# Patient Record
Sex: FEMALE | Race: WHITE | NOT HISPANIC OR LATINO | Employment: OTHER | URBAN - METROPOLITAN AREA
[De-identification: names, ages, dates, MRNs, and addresses within clinical notes are randomized per-mention and may not be internally consistent; named-entity substitution may affect disease eponyms.]

---

## 2018-03-22 LAB — HCV AB SER-ACNC: NEGATIVE

## 2019-05-20 LAB
ALBUMIN/GLOBULIN RATIO: 1.3
ALBUMIN: 3.9
ALP SERPL-CCNC: 156 U/L
ALT: 21
AST: 22
BILIRUBIN TOTAL: 0.4
CALCIUM SERPL-MCNC: 9.4 MG/DL
CARBON DIOXIDE, CO2: 28
CHLORIDE: 102
CHOLESTEROL, TOTAL: 202
CREAT SERPL-MCNC: 0.8 MG/DL
GLOBULIN: 3.1
GLUCOSE: 87
HDLC SERPL-MCNC: 53 MG/DL
LDLC SERPL CALC-MCNC: 131 MG/DL (ref 0–160)
POTASSIUM: 4.4
SODIUM: 139
TOTAL PROTEIN: 7 G/DL (ref 6.4–8.2)
TRIGL SERPL-MCNC: 84 MG/DL
UREA NITROGEN (BUN): 15

## 2019-08-16 ENCOUNTER — TELEPHONE (OUTPATIENT)
Dept: FAMILY MEDICINE | Facility: CLINIC | Age: 69
End: 2019-08-16

## 2019-08-16 ENCOUNTER — OFFICE VISIT (OUTPATIENT)
Dept: FAMILY MEDICINE | Facility: CLINIC | Age: 69
End: 2019-08-16
Payer: MEDICARE

## 2019-08-16 VITALS — BODY MASS INDEX: 40.48 KG/M2 | HEIGHT: 62 IN | WEIGHT: 220 LBS | TEMPERATURE: 99 F

## 2019-08-16 DIAGNOSIS — Z79.899 ENCOUNTER FOR LONG-TERM (CURRENT) USE OF MEDICATIONS: ICD-10-CM

## 2019-08-16 DIAGNOSIS — Z76.0 MEDICATION REFILL: Primary | ICD-10-CM

## 2019-08-16 DIAGNOSIS — M79.7 FIBROMYALGIA: ICD-10-CM

## 2019-08-16 DIAGNOSIS — E66.01 MORBID OBESITY WITH BMI OF 40.0-44.9, ADULT: ICD-10-CM

## 2019-08-16 PROBLEM — N17.9 AKI (ACUTE KIDNEY INJURY): Status: ACTIVE | Noted: 2018-07-04

## 2019-08-16 PROBLEM — E87.1 HYPONATREMIA: Status: ACTIVE | Noted: 2019-08-16

## 2019-08-16 PROBLEM — J96.01 ACUTE RESPIRATORY FAILURE WITH HYPOXIA AND HYPERCAPNIA: Status: ACTIVE | Noted: 2018-07-04

## 2019-08-16 PROBLEM — E87.20 ACIDOSIS: Status: ACTIVE | Noted: 2018-07-05

## 2019-08-16 PROBLEM — M17.0 PRIMARY OSTEOARTHRITIS OF BOTH KNEES: Status: ACTIVE | Noted: 2018-05-25

## 2019-08-16 PROBLEM — Z91.199 NON COMPLIANCE WITH MEDICAL TREATMENT: Status: ACTIVE | Noted: 2018-07-04

## 2019-08-16 PROBLEM — E87.6 HYPOKALEMIA: Status: ACTIVE | Noted: 2019-08-16

## 2019-08-16 PROBLEM — M85.89 OSTEOPENIA OF MULTIPLE SITES: Status: ACTIVE | Noted: 2018-08-16

## 2019-08-16 PROBLEM — J44.9 COPD (CHRONIC OBSTRUCTIVE PULMONARY DISEASE): Status: ACTIVE | Noted: 2019-08-16

## 2019-08-16 PROBLEM — J96.02 ACUTE RESPIRATORY FAILURE WITH HYPOXIA AND HYPERCAPNIA: Status: ACTIVE | Noted: 2018-07-04

## 2019-08-16 PROBLEM — M17.12 PRIMARY OSTEOARTHRITIS OF LEFT KNEE: Status: ACTIVE | Noted: 2018-07-02

## 2019-08-16 PROBLEM — H92.02 OTALGIA OF LEFT EAR: Status: ACTIVE | Noted: 2017-01-25

## 2019-08-16 PROCEDURE — 99999 PR PBB SHADOW E&M-NEW PATIENT-LVL III: CPT | Mod: PBBFAC,,, | Performed by: FAMILY MEDICINE

## 2019-08-16 PROCEDURE — 99999 PR PBB SHADOW E&M-NEW PATIENT-LVL III: ICD-10-PCS | Mod: PBBFAC,,, | Performed by: FAMILY MEDICINE

## 2019-08-16 PROCEDURE — 1101F PR PT FALLS ASSESS DOC 0-1 FALLS W/OUT INJ PAST YR: ICD-10-PCS | Mod: CPTII,S$GLB,, | Performed by: FAMILY MEDICINE

## 2019-08-16 PROCEDURE — 99204 PR OFFICE/OUTPT VISIT, NEW, LEVL IV, 45-59 MIN: ICD-10-PCS | Mod: S$GLB,,, | Performed by: FAMILY MEDICINE

## 2019-08-16 PROCEDURE — 1101F PT FALLS ASSESS-DOCD LE1/YR: CPT | Mod: CPTII,S$GLB,, | Performed by: FAMILY MEDICINE

## 2019-08-16 PROCEDURE — 99204 OFFICE O/P NEW MOD 45 MIN: CPT | Mod: S$GLB,,, | Performed by: FAMILY MEDICINE

## 2019-08-16 RX ORDER — ALPRAZOLAM 0.5 MG/1
0.5 TABLET ORAL NIGHTLY PRN
COMMUNITY
End: 2019-08-16 | Stop reason: SDUPTHER

## 2019-08-16 RX ORDER — ALBUTEROL SULFATE 90 UG/1
2 AEROSOL, METERED RESPIRATORY (INHALATION) EVERY 6 HOURS PRN
Qty: 1 INHALER | Refills: 3 | Status: SHIPPED | OUTPATIENT
Start: 2019-08-16

## 2019-08-16 RX ORDER — PRAVASTATIN SODIUM 20 MG/1
20 TABLET ORAL DAILY
Qty: 90 TABLET | Refills: 0 | Status: SHIPPED | OUTPATIENT
Start: 2019-08-16 | End: 2019-11-11 | Stop reason: SDUPTHER

## 2019-08-16 RX ORDER — VENLAFAXINE HYDROCHLORIDE 75 MG/1
75 CAPSULE, EXTENDED RELEASE ORAL DAILY
Qty: 90 CAPSULE | Refills: 0 | Status: SHIPPED | OUTPATIENT
Start: 2019-08-16 | End: 2019-11-11 | Stop reason: SDUPTHER

## 2019-08-16 RX ORDER — PANTOPRAZOLE SODIUM 40 MG/1
40 TABLET, DELAYED RELEASE ORAL DAILY
COMMUNITY
End: 2019-08-16 | Stop reason: SDUPTHER

## 2019-08-16 RX ORDER — CARVEDILOL 12.5 MG/1
12.5 TABLET ORAL 2 TIMES DAILY WITH MEALS
Qty: 180 TABLET | Refills: 0 | Status: SHIPPED | OUTPATIENT
Start: 2019-08-16 | End: 2019-11-11 | Stop reason: SDUPTHER

## 2019-08-16 RX ORDER — PANTOPRAZOLE SODIUM 40 MG/1
40 TABLET, DELAYED RELEASE ORAL DAILY
Qty: 90 TABLET | Refills: 0 | Status: SHIPPED | OUTPATIENT
Start: 2019-08-16 | End: 2019-11-11 | Stop reason: SDUPTHER

## 2019-08-16 RX ORDER — CARVEDILOL 12.5 MG/1
12.5 TABLET ORAL 2 TIMES DAILY WITH MEALS
COMMUNITY
End: 2019-08-16 | Stop reason: SDUPTHER

## 2019-08-16 RX ORDER — VENLAFAXINE HYDROCHLORIDE 75 MG/1
75 CAPSULE, EXTENDED RELEASE ORAL DAILY
COMMUNITY
End: 2019-08-16 | Stop reason: SDUPTHER

## 2019-08-16 RX ORDER — ALPRAZOLAM 0.5 MG/1
0.5 TABLET ORAL NIGHTLY PRN
Qty: 30 TABLET | Refills: 0 | Status: SHIPPED | OUTPATIENT
Start: 2019-08-16 | End: 2019-09-30 | Stop reason: SDUPTHER

## 2019-08-16 RX ORDER — CYCLOBENZAPRINE HCL 10 MG
10 TABLET ORAL 2 TIMES DAILY
Qty: 60 TABLET | Refills: 3 | Status: SHIPPED | OUTPATIENT
Start: 2019-08-16 | End: 2020-01-28

## 2019-08-16 RX ORDER — RIZATRIPTAN BENZOATE 10 MG/1
10 TABLET ORAL
COMMUNITY
End: 2022-10-20

## 2019-08-16 NOTE — TELEPHONE ENCOUNTER
----- Message from Nereyda Puentes sent at 8/16/2019  3:55 PM CDT -----  Contact: patient   Patient states her migraine medication has yet to be called into pharmacy.Please call back at [phone].      Thanks,  Nereyda uPentes

## 2019-08-16 NOTE — TELEPHONE ENCOUNTER
Patient states she is needing a refill of her Esgic sent to [facility]. Do not see this med on profile. Pt states she takes this for her migraines and is having bad headache now.

## 2019-08-18 PROBLEM — J96.01 ACUTE RESPIRATORY FAILURE WITH HYPOXIA AND HYPERCAPNIA: Status: RESOLVED | Noted: 2018-07-04 | Resolved: 2019-08-18

## 2019-08-18 PROBLEM — J96.02 ACUTE RESPIRATORY FAILURE WITH HYPOXIA AND HYPERCAPNIA: Status: RESOLVED | Noted: 2018-07-04 | Resolved: 2019-08-18

## 2019-08-18 RX ORDER — BUTALBITAL, ACETAMINOPHEN AND CAFFEINE 50; 325; 40 MG/1; MG/1; MG/1
1 TABLET ORAL EVERY 6 HOURS PRN
Qty: 30 TABLET | Refills: 1 | Status: SHIPPED | OUTPATIENT
Start: 2019-08-18 | End: 2019-09-17

## 2019-08-19 NOTE — ASSESSMENT & PLAN NOTE
Alkaline phosphatase is elevated.  Will monitor.    Complete history and physical was completed today.  Complete and thorough medication reconciliation was performed.  Discussed risks and benefits of medications.  Advised patient on orders and health maintenance.  We discussed old records and old labs if available.  Will request any records not available through epic.  Continue current medications listed on your summary sheet.

## 2019-08-19 NOTE — PROGRESS NOTES
Subjective:      Patient ID: Maria M Reeves is a 68 y.o. female.    Chief Complaint: Establish Care and Medication Refill      Problem List Items Addressed This Visit     Morbid obesity with BMI of 40.0-44.9, adult    Overview     BMI Readings from Last 3 Encounters:   08/16/19 40.24 kg/m²   01/15/13 42.43 kg/m²   01/15/13 42.44 kg/m²              Medication refill - Primary    Overview     See long-term med use above.  Patient needs refills reports compliance.  No side effects reported.         Encounter for long-term (current) use of medications    Overview     August 16, 2019   Patient is on CHRONIC long-term drug therapy for managed conditions. See medication list. Reports compliance.  No side effects reported.  Routine lab work is being monitored.  Patient does  need refills today.     Lab Results   Component Value Date    WBC 8.00 01/15/2013    HGB 13.6 01/15/2013    HCT 41.2 01/15/2013    MCV 86.2 01/15/2013     01/15/2013      CMP  Sodium   Date Value Ref Range Status   01/15/2013 142 136 - 145 mmol/L Final     Potassium   Date Value Ref Range Status   01/15/2013 3.8 3.5 - 5.1 mmol/L Final     Chloride   Date Value Ref Range Status   01/15/2013 104 95 - 110 mmol/L Final     CO2   Date Value Ref Range Status   01/15/2013 26 23 - 29 mmol/L Final     Glucose   Date Value Ref Range Status   01/15/2013 105 70 - 110 mg/dl Final     BUN, Bld   Date Value Ref Range Status   01/15/2013 11 8 - 23 mg/dl Final     Creatinine   Date Value Ref Range Status   01/15/2013 0.8 0.5 - 1.4 mg/dl Final     Calcium   Date Value Ref Range Status   01/15/2013 9.7 8.7 - 10.5 mg/dl Final     Total Protein   Date Value Ref Range Status   01/15/2013 7.9 6.0 - 8.4 g/dL Final     Albumin   Date Value Ref Range Status   01/15/2013 4.0 3.5 - 5.2 g/dl Final     Total Bilirubin   Date Value Ref Range Status   01/15/2013 0.5 0.1 - 1.0 mg/dl Final     Comment:     For infants and newborns, interpretation of results should be based  on  gestational age, weight and in agreement with clinical  observations.  .  Premature Infant recommended reference ranges:  Up to 24 hours.............<8.0 mg/dl  Up to 48 hours............<12.0 mg/dl  3-5 days..................<15.0 mg/dl  6-29 days.................<15.0 mg/dl     Alkaline Phosphatase   Date Value Ref Range Status   01/15/2013 148 (H) 55 - 135 U/L Final     AST   Date Value Ref Range Status   01/15/2013 28 10 - 40 U/L Final     ALT   Date Value Ref Range Status   01/15/2013 21 10 - 44 U/L Final     Anion Gap   Date Value Ref Range Status   01/15/2013 12.0 8 - 16 mmol/L Final     eGFR if    Date Value Ref Range Status   01/15/2013 >60 >60 mL/min Final     Comment:     Estimated glomerular filtration rate (eGFR) is normalized to an  average body surface area of 1.73 square meters.  The calculation  used to obtain the eGFR is the adjusted MDRD equation, which factors  patient sex, age, race, and creatinine result.  Since race is unknown  in our information system, the eGFR values for -American  and Non--American patients are given for each creatinine  result.     eGFR if non    Date Value Ref Range Status   01/15/2013 >60 >60 mL/min Final     No results found for: TSH            Current Assessment & Plan     Alkaline phosphatase is elevated.  Will monitor.    Complete history and physical was completed today.  Complete and thorough medication reconciliation was performed.  Discussed risks and benefits of medications.  Advised patient on orders and health maintenance.  We discussed old records and old labs if available.  Will request any records not available through epic.  Continue current medications listed on your summary sheet.           Fibromyalgia    Overview     Chronic.  Stable.  Patient takes Flexeril as needed.  Also on Effexor and Fioricet for headaches.         Current Assessment & Plan     Chronic.  Stable.                Past Medical  History:  Past Medical History:   Diagnosis Date    Arthritis     Asthma, moderate persistent     Depression     Fibromyalgia     GERD (gastroesophageal reflux disease)     HBP (high blood pressure)     Hyperlipemia     IBS (irritable bowel syndrome)     Migraines     Sleep apnea      Past Surgical History:   Procedure Laterality Date    bleeding ulcer      COLONOSCOPY N/A 12/17/2012    [facility]    EGD (ESOPHAGOGASTRODUODENOSCOPY) N/A 12/17/2012    Per[facility] at [facility]    HYSTERECTOMY      JOINT REPLACEMENT      TONSILLECTOMY      TOTAL KNEE ARTHROPLASTY Left 2018    Dr. Duke     Review of patient's allergies indicates:   Allergen Reactions    Budesonide-formoterol Shortness Of Breath     Throat closes up     Duloxetine Shortness Of Breath and Swelling    Meloxicam Shortness Of Breath and Swelling    Simvastatin Shortness Of Breath and Swelling    Zoloft [sertraline] Anaphylaxis     Tongue swelling    Amoxicillin-pot clavulanate Diarrhea    Histamine h2 inhibitors Other (See Comments)     Constipation    Metoclopramide Nausea And Vomiting    Metoprolol Other (See Comments)     Cough    Phenothiazines Other (See Comments)     Not sure of reaction.     Medication List with Changes/Refills   New Medications    BUTALBITAL-ACETAMINOPHEN-CAFFEINE -40 MG (FIORICET, ESGIC) -40 MG PER TABLET    Take 1 tablet by mouth every 6 (six) hours as needed for Headaches.   Current Medications    ALBUTEROL SULFATE 2.5 MG/0.5 ML NEBU    Take 2.5 mg by nebulization every 6 (six) hours as needed.      BECLOMETHASONE (QVAR) 80 MCG/ACTUATION AERO    Inhale 1 puff into the lungs 2 (two) times daily.      MELATONIN 3 MG TAB    Take 3 mg by mouth every evening.      RIZATRIPTAN (MAXALT) 10 MG TABLET    Take 10 mg by mouth as needed for Migraine.   Changed and/or Refilled Medications    Modified Medication Previous Medication     ALBUTEROL (PROVENTIL HFA) 90 MCG/ACTUATION INHALER albuterol (PROVENTIL HFA) 90 mcg/actuation HFAA       Inhale 2 puffs into the lungs every 6 (six) hours as needed.    Inhale 2 puffs into the lungs every 6 (six) hours as needed.      ALPRAZOLAM (XANAX) 0.5 MG TABLET ALPRAZolam (XANAX) 0.5 MG tablet       Take 1 tablet (0.5 mg total) by mouth nightly as needed for Anxiety.    Take 0.5 mg by mouth nightly as needed for Anxiety.    CARVEDILOL (COREG) 12.5 MG TABLET carvedilol (COREG) 12.5 MG tablet       Take 1 tablet (12.5 mg total) by mouth 2 (two) times daily with meals.    Take 12.5 mg by mouth 2 (two) times daily with meals.    CYCLOBENZAPRINE (FLEXERIL) 10 MG TABLET cyclobenzaprine (FLEXERIL) 10 MG tablet       Take 1 tablet (10 mg total) by mouth 2 (two) times daily.    Take 1 tablet (10 mg total) by mouth 2 (two) times daily.    PANTOPRAZOLE (PROTONIX) 40 MG TABLET pantoprazole (PROTONIX) 40 MG tablet       Take 1 tablet (40 mg total) by mouth once daily.    Take 40 mg by mouth once daily.    PRAVASTATIN (PRAVACHOL) 20 MG TABLET pravastatin (PRAVACHOL) 20 MG tablet       Take 1 tablet (20 mg total) by mouth once daily.    Take 20 mg by mouth once daily.      VENLAFAXINE (EFFEXOR-XR) 75 MG 24 HR CAPSULE venlafaxine (EFFEXOR-XR) 75 MG 24 hr capsule       Take 1 capsule (75 mg total) by mouth once daily.    Take 75 mg by mouth once daily.   Discontinued Medications    AMLODIPINE BESYLATE, BULK, MISC    10 mg by Misc.(Non-Drug; Combo Route) route nightly as needed.      CITALOPRAM (CELEXA) 40 MG TABLET    Take 40 mg by mouth once daily.      FEXOFENADINE (ALLEGRA) 60 MG TABLET    Take 60 mg by mouth once daily.      OMEPRAZOLE-SODIUM BICARBONATE (ZEGERID OTC) 20-1.1 MG-GRAM CAP    Take 20 mg by mouth once daily.      ONDANSETRON (ZOFRAN-ODT) 4 MG TBDL    One po q 8 h prn    POLYETHYLENE GLYCOL (GLYCOLAX) 17 GRAM/DOSE POWDER    Take 17 g by mouth once daily. , even up to 4 times a day if needed;  to prevent /  "relieve constipation.    SPIRONOLACTONE (ALDACTONE) 25 MG TABLET    Take 25 mg by mouth once daily.        Social History     Tobacco Use    Smoking status: Never Smoker    Smokeless tobacco: Never Used   Substance Use Topics    Alcohol use: No      Family History   Problem Relation Age of Onset    COPD Maternal Uncle         colon       I have reviewed the complete PMH, social history, surgical history, allergies and medications.  As well as family history.    Review of Systems   Constitutional: Positive for fatigue and unexpected weight change. Negative for chills and fever.   HENT: Negative for ear pain and sore throat.    Eyes: Negative for redness and visual disturbance.   Respiratory: Negative for cough and shortness of breath.    Cardiovascular: Negative for chest pain and palpitations.   Gastrointestinal: Negative for nausea and vomiting.   Musculoskeletal: Negative for arthralgias and myalgias.   Skin: Negative for rash and wound.   Neurological: Positive for headaches. Negative for weakness.   Psychiatric/Behavioral: Positive for decreased concentration and sleep disturbance. The patient is nervous/anxious.        Objective:     Temp 98.7 °F (37.1 °C)   Ht 5' 2" (1.575 m)   Wt 99.8 kg (220 lb)   BMI 40.24 kg/m²     Physical Exam   Constitutional: She is oriented to person, place, and time. She appears well-developed and well-nourished. No distress.   HENT:   Head: Normocephalic and atraumatic.   Eyes: Pupils are equal, round, and reactive to light. EOM are normal.   Neck: Normal range of motion. Neck supple.   Cardiovascular: Normal rate, regular rhythm, normal heart sounds and intact distal pulses.   No murmur heard.  Pulmonary/Chest: Effort normal and breath sounds normal. No respiratory distress. She has no wheezes.   Musculoskeletal: Normal range of motion. She exhibits tenderness. She exhibits no edema.   Neurological: She is alert and oriented to person, place, and time. No cranial nerve " deficit.   Skin: Skin is warm and dry. Capillary refill takes less than 2 seconds.   Psychiatric: She has a normal mood and affect. Her behavior is normal.   Nursing note and vitals reviewed.      Assessment:     1. Medication refill    2. Encounter for long-term (current) use of medications    3. Fibromyalgia    4. Morbid obesity with BMI of 40.0-44.9, adult        Plan:     I have Reviewed and summarized old records.  I have performed thorough medication reconciliation today and discussed risk and benefits of each medication.  I have reviewed labs and discussed with patient.  All questions were answered.  I am requesting old records and will review them once they are available.    Problem List Items Addressed This Visit     Morbid obesity with BMI of 40.0-44.9, adult    Medication refill - Primary    Relevant Medications    venlafaxine (EFFEXOR-XR) 75 MG 24 hr capsule    pravastatin (PRAVACHOL) 20 MG tablet    pantoprazole (PROTONIX) 40 MG tablet    cyclobenzaprine (FLEXERIL) 10 MG tablet    carvedilol (COREG) 12.5 MG tablet    albuterol (PROVENTIL HFA) 90 mcg/actuation inhaler    ALPRAZolam (XANAX) 0.5 MG tablet    butalbital-acetaminophen-caffeine -40 mg (FIORICET, ESGIC) -40 mg per tablet    Encounter for long-term (current) use of medications     Alkaline phosphatase is elevated.  Will monitor.    Complete history and physical was completed today.  Complete and thorough medication reconciliation was performed.  Discussed risks and benefits of medications.  Advised patient on orders and health maintenance.  We discussed old records and old labs if available.  Will request any records not available through epic.  Continue current medications listed on your summary sheet.           Relevant Medications    venlafaxine (EFFEXOR-XR) 75 MG 24 hr capsule    pravastatin (PRAVACHOL) 20 MG tablet    pantoprazole (PROTONIX) 40 MG tablet    cyclobenzaprine (FLEXERIL) 10 MG tablet    carvedilol (COREG) 12.5 MG  tablet    albuterol (PROVENTIL HFA) 90 mcg/actuation inhaler    ALPRAZolam (XANAX) 0.5 MG tablet    Fibromyalgia     Chronic.  Stable.         Relevant Medications    cyclobenzaprine (FLEXERIL) 10 MG tablet          Follow up in about 3 months (around 11/16/2019), or if symptoms worsen or fail to improve, for Med refills.    If no improvement in symptoms or symptoms worsen, advised to call/follow-up at clinic or go to ER. Patient voiced understanding and all questions/concerns were addressed.     DISCLAIMER: This note was compiled by using a speech recognition dictation system and therefore please be aware that typographical / speech recognition errors can and do occur.  Please contact me if you see any errors specifically.    Uriah Saez MD  We Offer Telehealth & Same Day Appointments!   Book your Telehealth appointment with me through my nurse or   Clinic appointments on [employer] [facility]!    Office: [phone]          Check out my Facebook Page and Follow Me at: CLICK HERE    Check out my website at [employer] by clicking on: CLICK HERE    To Schedule appointments online, go to [employer] [facility]: CLICK HERE     Location: [email or web address]    [address] [place] [address]    FAX: [phone]

## 2019-09-30 ENCOUNTER — TELEPHONE (OUTPATIENT)
Dept: FAMILY MEDICINE | Facility: CLINIC | Age: 69
End: 2019-09-30

## 2019-09-30 DIAGNOSIS — Z79.899 ENCOUNTER FOR LONG-TERM (CURRENT) USE OF MEDICATIONS: ICD-10-CM

## 2019-09-30 DIAGNOSIS — Z76.0 MEDICATION REFILL: ICD-10-CM

## 2019-09-30 RX ORDER — ALPRAZOLAM 0.5 MG/1
TABLET ORAL
Qty: 30 TABLET | Refills: 1 | Status: SHIPPED | OUTPATIENT
Start: 2019-09-30 | End: 2019-11-11 | Stop reason: SDUPTHER

## 2019-09-30 NOTE — TELEPHONE ENCOUNTER
Called and spoke with patient, patient notified that rx has been sent to her pharmacy and reminded of upcoming appointment in November.  Patient verbalized understanding of this.

## 2019-09-30 NOTE — TELEPHONE ENCOUNTER
Called and notified patient that rx has been sent to her pharmacy and was reminded of her appointment, patient verbalized understanding date and time of November appointment.

## 2019-09-30 NOTE — TELEPHONE ENCOUNTER
----- Message from Sachin Greer sent at 9/30/2019  2:11 PM CDT -----  Type:  RX Refill Request    Who Called:  Maria M Reeves  Refill or New Rx: refill   RX Name and Strength: ALPRAZolam (XANAX) 0.5 MG tablet  How is the patient currently taking it? (ex. 1XDay): 1xday  Is this a 30 day or 90 day RX: 30  [facility] Pharm[facility] with phone number:   [facility] [address] [facility] [address] [facility]  [address] [facility]  [place] [address]  Phone: [phone] Fax: [phone]  Local or Mail Order: local  Ordering Provider: Se  Would the patient rather a call back or a response via [facility]?  Call back  Best Call Back Number: [phone]  Additional Information:  The pt will prefer a 90 day supply

## 2019-10-15 RX ORDER — LEVOCETIRIZINE DIHYDROCHLORIDE 5 MG/1
5 TABLET, FILM COATED ORAL NIGHTLY
Qty: 30 TABLET | Refills: 11 | Status: SHIPPED | OUTPATIENT
Start: 2019-10-15 | End: 2019-11-11 | Stop reason: SDUPTHER

## 2019-10-15 RX ORDER — BUTALBITAL, ACETAMINOPHEN AND CAFFEINE 50; 325; 40 MG/1; MG/1; MG/1
1 TABLET ORAL EVERY 4 HOURS PRN
Qty: 30 TABLET | Refills: 0 | Status: SHIPPED | OUTPATIENT
Start: 2019-10-15 | End: 2019-11-14

## 2019-10-15 NOTE — TELEPHONE ENCOUNTER
Patient is requesting a prescription for xyzal to be sent to her pharmacy even though she knows it is over the counter her insurance will cover it with a prescription.  Patient also states that she uses butalbital 50/325/40mg tablets and requests a refill on that as well.  Please advise as orders are pended.

## 2019-10-15 NOTE — TELEPHONE ENCOUNTER
----- Message from Kaycee Jesus sent at 10/15/2019  4:01 PM CDT -----  Contact: Patient  .Type:  RX Refill Request    Who Called:  Patient  Refill or New Rx: Refill  RX Name and Strength: Butalbital -40, ledocetirizine 5 mg (Xyzal)  How is the patient currently taking it? (ex. 1XDay):  Is this a 30 day or 90 day RX:  Preferred Pharmacy with phone number: .      [facility]  [place] - [address]  [address] [facility] [address]  [facility] [address]  Phone: [phone] Fax: [phone]      Local or Mail Order: Local   Ordering Provider: Se  Would the patient rather a call back or a response via [facility]?  Call  Best Call Back Number: .[phone] (home) [phone] (work)  Additional Information:

## 2019-10-16 ENCOUNTER — TELEPHONE (OUTPATIENT)
Dept: FAMILY MEDICINE | Facility: CLINIC | Age: 69
End: 2019-10-16

## 2019-10-16 NOTE — TELEPHONE ENCOUNTER
----- Message from Renée Strickland sent at 10/16/2019 11:51 AM CDT -----  Contact: pt  The pt wants to know if her medication where called into [facility], the pt gave no additional info and can be reached at [phone]///[id number]

## 2019-10-16 NOTE — TELEPHONE ENCOUNTER
Called and spoke with patient medication was [facility] to [facility] [place], patient requested [facility] in [place], patient states taht she will call [facility] to attempt a pharmacy to pharmacy transfer and will let us know what she finds out, patient was advised that Dr. Saez was not in clinic until tomorrow as today was his half day, patient verbalized understanding of this.

## 2019-11-04 ENCOUNTER — PATIENT OUTREACH (OUTPATIENT)
Dept: ADMINISTRATIVE | Facility: HOSPITAL | Age: 69
End: 2019-11-04

## 2019-11-04 NOTE — PROGRESS NOTES
[facility] reviewed. External labs from [facility] entered. Mammogram dated 5/20/19 from [facility] entered. Dexa Scan order pended.

## 2019-11-07 ENCOUNTER — PATIENT OUTREACH (OUTPATIENT)
Dept: ADMINISTRATIVE | Facility: HOSPITAL | Age: 69
End: 2019-11-07

## 2019-11-11 ENCOUNTER — OFFICE VISIT (OUTPATIENT)
Dept: FAMILY MEDICINE | Facility: CLINIC | Age: 69
End: 2019-11-11
Payer: MEDICARE

## 2019-11-11 ENCOUNTER — HOSPITAL ENCOUNTER (OUTPATIENT)
Dept: RADIOLOGY | Facility: HOSPITAL | Age: 69
Discharge: HOME | End: 2019-11-11
Attending: FAMILY MEDICINE
Payer: MEDICARE

## 2019-11-11 VITALS
TEMPERATURE: 99 F | WEIGHT: 215.19 LBS | HEIGHT: 62 IN | BODY MASS INDEX: 39.6 KG/M2 | SYSTOLIC BLOOD PRESSURE: 120 MMHG | DIASTOLIC BLOOD PRESSURE: 76 MMHG | HEART RATE: 76 BPM

## 2019-11-11 DIAGNOSIS — M25.50 MULTIPLE JOINT PAIN: ICD-10-CM

## 2019-11-11 DIAGNOSIS — Z78.0 MENOPAUSE: ICD-10-CM

## 2019-11-11 DIAGNOSIS — G47.00 INSOMNIA, UNSPECIFIED TYPE: ICD-10-CM

## 2019-11-11 DIAGNOSIS — Z23 FLU VACCINE NEED: ICD-10-CM

## 2019-11-11 DIAGNOSIS — M25.561 RIGHT KNEE PAIN, UNSPECIFIED CHRONICITY: ICD-10-CM

## 2019-11-11 DIAGNOSIS — Z79.899 ENCOUNTER FOR LONG-TERM (CURRENT) USE OF MEDICATIONS: ICD-10-CM

## 2019-11-11 DIAGNOSIS — E78.2 MIXED HYPERLIPIDEMIA: ICD-10-CM

## 2019-11-11 DIAGNOSIS — Z78.0 MENOPAUSE: Primary | ICD-10-CM

## 2019-11-11 DIAGNOSIS — J44.9 CHRONIC OBSTRUCTIVE PULMONARY DISEASE, UNSPECIFIED COPD TYPE: ICD-10-CM

## 2019-11-11 DIAGNOSIS — I10 ESSENTIAL HYPERTENSION: ICD-10-CM

## 2019-11-11 DIAGNOSIS — Z76.0 MEDICATION REFILL: ICD-10-CM

## 2019-11-11 PROBLEM — N17.9 AKI (ACUTE KIDNEY INJURY): Status: RESOLVED | Noted: 2018-07-04 | Resolved: 2019-11-11

## 2019-11-11 PROCEDURE — 90662 FLU VACCINE - HIGH DOSE (65+) PRESERVATIVE FREE IM: ICD-10-PCS | Mod: S$GLB,,, | Performed by: FAMILY MEDICINE

## 2019-11-11 PROCEDURE — 99215 PR OFFICE/OUTPT VISIT, EST, LEVL V, 40-54 MIN: ICD-10-PCS | Mod: 25,S$GLB,, | Performed by: FAMILY MEDICINE

## 2019-11-11 PROCEDURE — 99215 OFFICE O/P EST HI 40 MIN: CPT | Mod: 25,S$GLB,, | Performed by: FAMILY MEDICINE

## 2019-11-11 PROCEDURE — 3288F PR FALLS RISK ASSESSMENT DOCUMENTED: ICD-10-PCS | Mod: CPTII,S$GLB,, | Performed by: FAMILY MEDICINE

## 2019-11-11 PROCEDURE — 1100F PTFALLS ASSESS-DOCD GE2>/YR: CPT | Mod: CPTII,S$GLB,, | Performed by: FAMILY MEDICINE

## 2019-11-11 PROCEDURE — 3074F SYST BP LT 130 MM HG: CPT | Mod: CPTII,S$GLB,, | Performed by: FAMILY MEDICINE

## 2019-11-11 PROCEDURE — 3074F PR MOST RECENT SYSTOLIC BLOOD PRESSURE < 130 MM HG: ICD-10-PCS | Mod: CPTII,S$GLB,, | Performed by: FAMILY MEDICINE

## 2019-11-11 PROCEDURE — 90662 IIV NO PRSV INCREASED AG IM: CPT | Mod: S$GLB,,, | Performed by: FAMILY MEDICINE

## 2019-11-11 PROCEDURE — 3078F DIAST BP <80 MM HG: CPT | Mod: CPTII,S$GLB,, | Performed by: FAMILY MEDICINE

## 2019-11-11 PROCEDURE — 77080 DXA BONE DENSITY AXIAL: CPT | Mod: 26,,, | Performed by: RADIOLOGY

## 2019-11-11 PROCEDURE — 1100F PR PT FALLS ASSESS DOC 2+ FALLS/FALL W/INJURY/YR: ICD-10-PCS | Mod: CPTII,S$GLB,, | Performed by: FAMILY MEDICINE

## 2019-11-11 PROCEDURE — G0008 FLU VACCINE - HIGH DOSE (65+) PRESERVATIVE FREE IM: ICD-10-PCS | Mod: S$GLB,,, | Performed by: FAMILY MEDICINE

## 2019-11-11 PROCEDURE — 77080 DXA BONE DENSITY AXIAL: CPT | Mod: TC,PO

## 2019-11-11 PROCEDURE — 77080 DEXA BONE DENSITY SPINE HIP: ICD-10-PCS | Mod: 26,,, | Performed by: RADIOLOGY

## 2019-11-11 PROCEDURE — 3078F PR MOST RECENT DIASTOLIC BLOOD PRESSURE < 80 MM HG: ICD-10-PCS | Mod: CPTII,S$GLB,, | Performed by: FAMILY MEDICINE

## 2019-11-11 PROCEDURE — G0008 ADMIN INFLUENZA VIRUS VAC: HCPCS | Mod: S$GLB,,, | Performed by: FAMILY MEDICINE

## 2019-11-11 PROCEDURE — 99999 PR PBB SHADOW E&M-EST. PATIENT-LVL III: CPT | Mod: PBBFAC,,, | Performed by: FAMILY MEDICINE

## 2019-11-11 PROCEDURE — 3288F FALL RISK ASSESSMENT DOCD: CPT | Mod: CPTII,S$GLB,, | Performed by: FAMILY MEDICINE

## 2019-11-11 PROCEDURE — 99999 PR PBB SHADOW E&M-EST. PATIENT-LVL III: ICD-10-PCS | Mod: PBBFAC,,, | Performed by: FAMILY MEDICINE

## 2019-11-11 RX ORDER — PANTOPRAZOLE SODIUM 40 MG/1
40 TABLET, DELAYED RELEASE ORAL DAILY
Qty: 90 TABLET | Refills: 3 | Status: SHIPPED | OUTPATIENT
Start: 2019-11-11 | End: 2020-12-04

## 2019-11-11 RX ORDER — CARVEDILOL 12.5 MG/1
12.5 TABLET ORAL 2 TIMES DAILY WITH MEALS
Qty: 180 TABLET | Refills: 3 | Status: SHIPPED | OUTPATIENT
Start: 2019-11-11 | End: 2020-10-27

## 2019-11-11 RX ORDER — PRAVASTATIN SODIUM 20 MG/1
20 TABLET ORAL DAILY
Qty: 90 TABLET | Refills: 3 | Status: SHIPPED | OUTPATIENT
Start: 2019-11-11 | End: 2020-10-27

## 2019-11-11 RX ORDER — ALPRAZOLAM 0.5 MG/1
TABLET ORAL
Qty: 30 TABLET | Refills: 1 | Status: SHIPPED | OUTPATIENT
Start: 2019-11-11 | End: 2020-01-28

## 2019-11-11 RX ORDER — DICLOFENAC SODIUM 10 MG/G
GEL TOPICAL
Qty: 100 G | Refills: 1 | Status: SHIPPED | OUTPATIENT
Start: 2019-11-11

## 2019-11-11 RX ORDER — LEVOCETIRIZINE DIHYDROCHLORIDE 5 MG/1
5 TABLET, FILM COATED ORAL NIGHTLY
Qty: 30 TABLET | Refills: 11 | Status: SHIPPED | OUTPATIENT
Start: 2019-11-11 | End: 2020-10-27

## 2019-11-11 RX ORDER — VENLAFAXINE HYDROCHLORIDE 75 MG/1
75 CAPSULE, EXTENDED RELEASE ORAL DAILY
Qty: 90 CAPSULE | Refills: 3 | Status: SHIPPED | OUTPATIENT
Start: 2019-11-11 | End: 2020-10-27

## 2019-11-11 NOTE — PROGRESS NOTES
Please call the patient with results. .The bone density test (DEXA scan) shows osteopenia, known as weakening of the bones. You need to be on Calcium and Vitamin D supplementation and also start using weight bearing exercises to help reduce the risk of a fracture.  Also, please start oscal 500+d taking 1 po tid.    Give #90 and rf x 11.    Ask the patient to recheck the DEXA scan in 2 years.

## 2019-11-11 NOTE — ASSESSMENT & PLAN NOTE
Continue pravastatin 20 mg daily.  Recheck fasting lipid panel.  Discussed hyperlipidemia disease course, healthy diet and increased need for exercise.  Discussed the risk of cardiovascular disease, increase stroke and heart attack risk.    Patient voiced understanding and understood the treatment plan. All questions were answered.

## 2019-11-11 NOTE — ASSESSMENT & PLAN NOTE
Increase melatonin to 10 mg.  Patient currently taking 5 mg.  Refill Xanax.The patient was checked in the [facility] Board of Pharmacy's  Prescription Monitoring Program. There appears to be no incongruities with the patient's verbalized history.   Discussed risks and benefits of long-term medication use.  ER precautions.

## 2019-11-11 NOTE — PATIENT INSTRUCTIONS
Follow up in about 6 months (around 5/11/2020), or if symptoms worsen or fail to improve, for Knee pain, LAB RESULTS, HTN, HLD.     If no improvement in symptoms or symptoms worsen, please be advised to call MD, follow-up at clinic and/or go to ER if becomes severe.    Uriah Saez M.D.         We Offer TELEHEATLH & Same Day Appointments!   Book your Telehealth appointment with me through my nurse or   Clinic appointments on [facility]!    [address] [place] [address]    Office: [phone]     FAX: [phone]    Check out my Facebook Page and Follow Me at: CLICK HERE    Check out my website at [employer] by clicking on: CLICK HERE    To Schedule appointments online, go to [facility]: CLICK HERE     Location: [email or web address]

## 2019-11-11 NOTE — ASSESSMENT & PLAN NOTE
Plan inject the knee with steroid if no improvement with Voltaren gel.  Patient will reestablish with Dr. Uriah Duke if no improvement.  ER precautions.

## 2019-11-11 NOTE — ASSESSMENT & PLAN NOTE
Alkaline phosphatase is elevated.  Will monitor.  Otherwise stable blood work.  Due for thyroid studies.    Complete history and physical was completed today.  Complete and thorough medication reconciliation was performed.  Discussed risks and benefits of medications.  Advised patient on orders and health maintenance.  We discussed old records and old labs if available.  Will request any records not available through epic.  Continue current medications listed on your summary sheet.

## 2019-11-11 NOTE — ASSESSMENT & PLAN NOTE
Continue albuterol inhaler.  Continue to monitor for signs and symptoms of COPD.  Discussed ER precautions.

## 2019-11-11 NOTE — ASSESSMENT & PLAN NOTE
Checking update bone density scanning.  Recommend supplementation with calcium vitamin-D prophylactic.

## 2019-11-11 NOTE — PROGRESS NOTES
PLAN:      Problem List Items Addressed This Visit     Encounter for long-term (current) use of medications (Chronic)     Alkaline phosphatase is elevated.  Will monitor.  Otherwise stable blood work.  Due for thyroid studies.    Complete history and physical was completed today.  Complete and thorough medication reconciliation was performed.  Discussed risks and benefits of medications.  Advised patient on orders and health maintenance.  We discussed old records and old labs if available.  Will request any records not available through epic.  Continue current medications listed on your summary sheet.         Relevant Medications    carvedilol (COREG) 12.5 MG tablet    pravastatin (PRAVACHOL) 20 MG tablet    pantoprazole (PROTONIX) 40 MG tablet    venlafaxine (EFFEXOR-XR) 75 MG 24 hr capsule    ALPRAZolam (XANAX) 0.5 MG tablet    Other Relevant Orders    CBC auto differential    Comprehensive metabolic panel    Hemoglobin A1c    Lipid panel    TSH    Essential hypertension     Discussed hypertension disease course, DASH-diet and exercise.  Discussed medication regimen importance of treating high blood pressure.  Patient advised of risk of untreated blood pressure.    ER precautions were given for symptoms of hypertensive urgency and emergency.           Hyperlipidemia     Continue pravastatin 20 mg daily.  Recheck fasting lipid panel.  Discussed hyperlipidemia disease course, healthy diet and increased need for exercise.  Discussed the risk of cardiovascular disease, increase stroke and heart attack risk.    Patient voiced understanding and understood the treatment plan. All questions were answered.              Relevant Orders    Lipid panel    Medication refill     Discussed risks and benefits of medications.  Monitor for side effects.         Relevant Medications    carvedilol (COREG) 12.5 MG tablet    pravastatin (PRAVACHOL) 20 MG tablet    levocetirizine (XYZAL) 5 MG tablet    pantoprazole (PROTONIX) 40 MG  tablet    venlafaxine (EFFEXOR-XR) 75 MG 24 hr capsule    ALPRAZolam (XANAX) 0.5 MG tablet    Menopause - Primary     Checking update bone density scanning.  Recommend supplementation with calcium vitamin-D prophylactic.         Relevant Orders    DXA Bone Density Spine And Hip    Chronic obstructive pulmonary disease     Continue albuterol inhaler.  Continue to monitor for signs and symptoms of COPD.  Discussed ER precautions.         Right knee pain     Plan inject the knee with steroid if no improvement with Voltaren gel.  Patient will reestablish with Dr. Uriah Duke if no improvement.  ER precautions.         Relevant Medications    diclofenac sodium (VOLTAREN) 1 % Gel    Multiple joint pain     Checking labs.  Patient cannot take anti-inflammatories due to GI upset.  Offered alternatives.  Will start Voltaren gel.  Offered steroid injection which patient declined at this visit.  ER precautions.         Relevant Medications    diclofenac sodium (VOLTAREN) 1 % Gel    Other Relevant Orders    ERICA Screen w/Reflex    Rheumatoid factor    Cyclic citrul peptide antibody, IgG    Insomnia     Increase melatonin to 10 mg.  Patient currently taking 5 mg.  Refill Xanax.The patient was checked in the [facility] Board [facility] Pharmacy's  Prescription Monitoring Program. There appears to be no incongruities with the patient's verbalized history.   Discussed risks and benefits of long-term medication use.  ER precautions.         Relevant Medications    ALPRAZolam (XANAX) 0.5 MG tablet      Other Visit Diagnoses     Flu vaccine need        Relevant Orders    Influenza - High Dose (65+) (PF) (IM) (Completed)        Future Appointments     Date Provider Specialty Appt Notes    11/11/2019  Lab     5/19/2020 Uriah Saez MD Family Medicine 4 week f/u lab,htn/hld           Medication List with Changes/Refills   New Medications    DICLOFENAC SODIUM (VOLTAREN) 1 % GEL    Apply small amount (2 grams) to painful joint area 4  times daily as needed   Current Medications    ALBUTEROL (PROVENTIL HFA) 90 MCG/ACTUATION INHALER    Inhale 2 puffs into the lungs every 6 (six) hours as needed.    ALBUTEROL SULFATE 2.5 MG/0.5 ML NEBU    Take 2.5 mg by nebulization every 6 (six) hours as needed.      BUTALBITAL-ACETAMINOPHEN-CAFFEINE -40 MG (FIORICET, ESGIC) -40 MG PER TABLET    Take 1 tablet by mouth every 4 (four) hours as needed for Pain.    CYCLOBENZAPRINE (FLEXERIL) 10 MG TABLET    Take 1 tablet (10 mg total) by mouth 2 (two) times daily.    MELATONIN 3 MG TAB    Take 3 mg by mouth every evening.      RIZATRIPTAN (MAXALT) 10 MG TABLET    Take 10 mg by mouth as needed for Migraine.   Changed and/or Refilled Medications    Modified Medication Previous Medication    ALPRAZOLAM (XANAX) 0.5 MG TABLET ALPRAZolam (XANAX) 0.5 MG tablet       TAKE ONE TABLET BY MOUTH EVERY NIGHT AT BEDTIME AS NEEDED FOR ANXIETY    TAKE ONE TABLET BY MOUTH EVERY NIGHT AT BEDTIME AS NEEDED FOR ANXIETY    CARVEDILOL (COREG) 12.5 MG TABLET carvedilol (COREG) 12.5 MG tablet       Take 1 tablet (12.5 mg total) by mouth 2 (two) times daily with meals.    Take 1 tablet (12.5 mg total) by mouth 2 (two) times daily with meals.    LEVOCETIRIZINE (XYZAL) 5 MG TABLET levocetirizine (XYZAL) 5 MG tablet       Take 1 tablet (5 mg total) by mouth every evening.    Take 1 tablet (5 mg total) by mouth every evening.    PANTOPRAZOLE (PROTONIX) 40 MG TABLET pantoprazole (PROTONIX) 40 MG tablet       Take 1 tablet (40 mg total) by mouth once daily.    Take 1 tablet (40 mg total) by mouth once daily.    PRAVASTATIN (PRAVACHOL) 20 MG TABLET pravastatin (PRAVACHOL) 20 MG tablet       Take 1 tablet (20 mg total) by mouth once daily.    Take 1 tablet (20 mg total) by mouth once daily.    VENLAFAXINE (EFFEXOR-XR) 75 MG 24 HR CAPSULE venlafaxine (EFFEXOR-XR) 75 MG 24 hr capsule       Take 1 capsule (75 mg total) by mouth once daily.    Take 1 capsule (75 mg total) by mouth once daily.    Discontinued Medications    BECLOMETHASONE (QVAR) 80 MCG/ACTUATION AERO    Inhale 1 puff into the lungs 2 (two) times daily.         Uriah Saez M.D.     ==========================================================================  Subjective:      Patient ID: Maria M Reeves is a 68 y.o. female.  has a past medical history of Arthritis, Asthma, moderate persistent, Depression, Encounter for long-term (current) use of medications (8/16/2019), Fibromyalgia, GERD (gastroesophageal reflux disease), HBP (high blood pressure), Hyperlipemia, IBS (irritable bowel syndrome), Migraines, and Sleep apnea.     Chief Complaint: Follow-up (3 month f/u med check); Flu Vaccine; and Knee Pain (right knee pain)      Problem List Items Addressed This Visit     Encounter for long-term (current) use of medications (Chronic)    Overview     August 16, 2019   Patient is on CHRONIC long-term drug therapy for managed conditions. See medication list. Reports compliance.  No side effects reported.  Routine lab work is being monitored.  Patient does  need refills today.   =======================================================  November 2019:    CHRONIC. Stable. Compliant with medications for managed conditions. See medication list. No SE reported.   Routine lab analysis is being monitored. Refills were addressed.  Lab Results   Component Value Date    WBC 8.00 01/15/2013    HGB 13.6 01/15/2013    HCT 41.2 01/15/2013    MCV 86.2 01/15/2013     01/15/2013       Chemistry        Component Value Date/Time     05/20/2019    K 4.4 05/20/2019     05/20/2019    CO2 28 05/20/2019    BUN 15 05/20/2019    CREATININE 0.8 05/20/2019     01/15/2013 0850        Component Value Date/Time    CALCIUM 9.4 05/20/2019    ALKPHOS 156 05/20/2019    AST 28 01/15/2013 0850    ALT 21 05/20/2019    BILITOT 0.5 01/15/2013 0850    ESTGFRAFRICA >60 01/15/2013 0850    EGFRNONAA >60 01/15/2013 0850        No results found for: TSH, E4POLZH,  H5KXKBW, THYROIDAB, FREET4, T3FREE    ======================================================         Current Assessment & Plan     Alkaline phosphatase is elevated.  Will monitor.  Otherwise stable blood work.  Due for thyroid studies.    Complete history and physical was completed today.  Complete and thorough medication reconciliation was performed.  Discussed risks and benefits of medications.  Advised patient on orders and health maintenance.  We discussed old records and old labs if available.  Will request any records not available through epic.  Continue current medications listed on your summary sheet.         Essential hypertension    Overview     CHRONIC. STABLE. BP Reviewed.  Compliant with BP medications. No SE reported.   (-) CP, SOB, palpitations, dizziness, lightheadedness, HA, arm numbness, tingling or weakness, syncope.  Creatinine   Date Value Ref Range Status   05/20/2019 0.8 mg/dL Final              Current Assessment & Plan     Discussed hypertension disease course, DASH-diet and exercise.  Discussed medication regimen importance of treating high blood pressure.  Patient advised of risk of untreated blood pressure.    ER precautions were given for symptoms of hypertensive urgency and emergency.           Hyperlipidemia    Overview     CHRONIC. STABLE. Lab analysis reviewed.   (-) CP, SOB, abdominal pain, N/V/D, constipation, jaundice, skin changes.  (-) Myalgias  No results found for: CHOL  Lab Results   Component Value Date    HDL 53 05/20/2019     Lab Results   Component Value Date    LDLCALC 131 05/20/2019     Lab Results   Component Value Date    TRIG 84 05/20/2019     No results found for: CHOLHDL  No results found for: TOTALCHOLEST  Lab Results   Component Value Date    ALT 21 05/20/2019    AST 28 01/15/2013    ALKPHOS 156 05/20/2019    BILITOT 0.5 01/15/2013     ======================================================           Current Assessment & Plan     Continue pravastatin 20 mg daily.   Recheck fasting lipid panel.  Discussed hyperlipidemia disease course, healthy diet and increased need for exercise.  Discussed the risk of cardiovascular disease, increase stroke and heart attack risk.    Patient voiced understanding and understood the treatment plan. All questions were answered.              Medication refill    Overview     See long-term med use above.  Patient needs refills reports compliance.  No side effects reported.         Current Assessment & Plan     Discussed risks and benefits of medications.  Monitor for side effects.         Menopause - Primary    Overview     Patient is due for bone density scanning.  Patient is not currently taking calcium or vitamin-D.         Current Assessment & Plan     Checking update bone density scanning.  Recommend supplementation with calcium vitamin-D prophylactic.         Chronic obstructive pulmonary disease    Overview     Chronic.  Stable.  Patient uses albuterol as needed.  Patient not requiring maintenance therapy at this time.  Patient has never been intubated.  Does not require oxygen.         Current Assessment & Plan     Continue albuterol inhaler.  Continue to monitor for signs and symptoms of COPD.  Discussed ER precautions.         Right knee pain    Overview     Onset:  Acute on chronic  Duration:  Months to years  Location:  Right knee, patient is status post left knee replacement several years ago.  Patient has osteoarthritis in the right knee.  Previously performed by Dr. Uriah Duke.  Treatments:  Over-the-counter pain relievers including Tylenol, Flexeril patient reports that she cannot take anti-inflammatories due to GI upset.  Previous episodes:  Yes patient has had steroid injections before but they never help, patient has been to physical therapy.  No trauma reported.           Current Assessment & Plan     Plan inject the knee with steroid if no improvement with Voltaren gel.  Patient will reestablish with Dr. Uriah Duke if no  improvement.  ER precautions.         Multiple joint pain    Overview     Patient reports worsening and hand pain with knee pain as well.  Patient has never been checked for autoimmune disease.         Current Assessment & Plan     Checking labs.  Patient cannot take anti-inflammatories due to GI upset.  Offered alternatives.  Will start Voltaren gel.  Offered steroid injection which patient declined at this visit.  ER precautions.         Insomnia    Overview     Chronic.  Controlled.  Stable.  Reports compliance with Xanax.  No abuse noted. The patient was checked in the [place] Board of Pharmacy's  Prescription Monitoring Program. There appears to be no incongruities with the patient's verbalized history.              Current Assessment & Plan     Increase melatonin to 10 mg.  Patient currently taking 5 mg.  Refill Xanax.The patient was checked in the [place] Board of [employer]acy's  Prescription Monitoring Program. There appears to be no incongruities with the patient's verbalized history.   Discussed risks and benefits of long-term medication use.  ER precautions.           Other Visit Diagnoses     Flu vaccine need               Past Medical History:  Past Medical History:   Diagnosis Date    Arthritis     Asthma, moderate persistent     Depression     Encounter for long-term (current) use of medications 8/16/2019 August 16, 2019  Patient is on CHRONIC long-term drug therapy for managed conditions. See medication list. Reports compliance.  No side effects reported.  Routine lab work is being monitored.  Patient does  need refills today.   Lab Results Component Value Date  WBC 8.00 01/15/2013  HGB 13.6 01/15/2013  HCT 41.2 01/15/2013  MCV 86.2 01/15/2013   01/15/2013   CMP Sodium Date Value Ref Range     Fibromyalgia     GERD (gastroesophageal reflux disease)     HBP (high blood pressure)     Hyperlipemia     IBS (irritable bowel syndrome)     Migraines     Sleep apnea      Past  Surgical History:   Procedure Laterality Date    bleeding ulcer      HYSTERECTOMY      JOINT REPLACEMENT      TONSILLECTOMY      TOTAL KNEE ARTHROPLASTY Left 2018    Dr. Duke     Review of patient's allergies indicates:   Allergen Reactions    Budesonide-formoterol Shortness Of Breath     Throat closes up     Duloxetine Shortness Of Breath and Swelling    Meloxicam Shortness Of Breath and Swelling    Simvastatin Shortness Of Breath and Swelling    Zoloft [sertraline] Anaphylaxis     Tongue swelling    Amoxicillin-pot clavulanate Diarrhea    Histamine h2 inhibitors Other (See Comments)     Constipation    Metoclopramide Nausea And Vomiting    Metoprolol Other (See Comments)     Cough    Phenothiazines Other (See Comments)     Not sure of reaction.     Medication List with Changes/Refills   New Medications    DICLOFENAC SODIUM (VOLTAREN) 1 % GEL    Apply small amount (2 grams) to painful joint area 4 times daily as needed   Current Medications    ALBUTEROL (PROVENTIL HFA) 90 MCG/ACTUATION INHALER    Inhale 2 puffs into the lungs every 6 (six) hours as needed.    ALBUTEROL SULFATE 2.5 MG/0.5 ML NEBU    Take 2.5 mg by nebulization every 6 (six) hours as needed.      BUTALBITAL-ACETAMINOPHEN-CAFFEINE -40 MG (FIORICET, ESGIC) -40 MG PER TABLET    Take 1 tablet by mouth every 4 (four) hours as needed for Pain.    CYCLOBENZAPRINE (FLEXERIL) 10 MG TABLET    Take 1 tablet (10 mg total) by mouth 2 (two) times daily.    MELATONIN 3 MG TAB    Take 3 mg by mouth every evening.      RIZATRIPTAN (MAXALT) 10 MG TABLET    Take 10 mg by mouth as needed for Migraine.   Changed and/or Refilled Medications    Modified Medication Previous Medication    ALPRAZOLAM (XANAX) 0.5 MG TABLET ALPRAZolam (XANAX) 0.5 MG tablet       TAKE ONE TABLET BY MOUTH EVERY NIGHT AT BEDTIME AS NEEDED FOR ANXIETY    TAKE ONE TABLET BY MOUTH EVERY NIGHT AT BEDTIME AS NEEDED FOR ANXIETY    CARVEDILOL (COREG) 12.5 MG TABLET carvedilol  (COREG) 12.5 MG tablet       Take 1 tablet (12.5 mg total) by mouth 2 (two) times daily with meals.    Take 1 tablet (12.5 mg total) by mouth 2 (two) times daily with meals.    LEVOCETIRIZINE (XYZAL) 5 MG TABLET levocetirizine (XYZAL) 5 MG tablet       Take 1 tablet (5 mg total) by mouth every evening.    Take 1 tablet (5 mg total) by mouth every evening.    PANTOPRAZOLE (PROTONIX) 40 MG TABLET pantoprazole (PROTONIX) 40 MG tablet       Take 1 tablet (40 mg total) by mouth once daily.    Take 1 tablet (40 mg total) by mouth once daily.    PRAVASTATIN (PRAVACHOL) 20 MG TABLET pravastatin (PRAVACHOL) 20 MG tablet       Take 1 tablet (20 mg total) by mouth once daily.    Take 1 tablet (20 mg total) by mouth once daily.    VENLAFAXINE (EFFEXOR-XR) 75 MG 24 HR CAPSULE venlafaxine (EFFEXOR-XR) 75 MG 24 hr capsule       Take 1 capsule (75 mg total) by mouth once daily.    Take 1 capsule (75 mg total) by mouth once daily.   Discontinued Medications    BECLOMETHASONE (QVAR) 80 MCG/ACTUATION AERO    Inhale 1 puff into the lungs 2 (two) times daily.        Social History     Tobacco Use    Smoking status: Never Smoker    Smokeless tobacco: Never Used   Substance Use Topics    Alcohol use: No      Family History   Problem Relation Age of Onset    COPD Maternal Uncle         colon       I have reviewed the complete PMH, social history, surgical history, allergies and medications.  As well as family history.    Review of Systems   Constitutional: Negative for activity change and fatigue.   HENT: Negative for congestion and sinus pain.    Eyes: Negative for visual disturbance.   Respiratory: Negative for chest tightness and shortness of breath.    Cardiovascular: Negative for palpitations and leg swelling.   Gastrointestinal: Negative for abdominal pain, diarrhea and nausea.   Endocrine: Negative for polyuria.   Genitourinary: Negative for difficulty urinating and frequency.   Musculoskeletal: Positive for arthralgias (right  "knee pain). Negative for joint swelling.   Skin: Negative for rash.   Neurological: Negative for dizziness and headaches.   Psychiatric/Behavioral: Negative for agitation. The patient is not nervous/anxious.        Objective:     /76   Pulse 76   Temp 98.7 °F (37.1 °C) (Oral)   Ht 5' 2" (1.575 m)   Wt 97.6 kg (215 lb 3.2 oz)   BMI 39.36 kg/m²     Physical Exam   Constitutional: She is oriented to person, place, and time. She appears well-developed and well-nourished. No distress.   HENT:   Head: Normocephalic and atraumatic.   Eyes: Pupils are equal, round, and reactive to light. EOM are normal.   Neck: Normal range of motion. Neck supple.   Cardiovascular: Normal rate, regular rhythm, normal heart sounds and intact distal pulses.   No murmur heard.  Pulmonary/Chest: Effort normal and breath sounds normal. No respiratory distress. She has no wheezes.   Musculoskeletal: Normal range of motion. She exhibits tenderness (Right knee medial joint tenderness, crepitus noted.). She exhibits no edema.   Neurological: She is alert and oriented to person, place, and time. No cranial nerve deficit.   Skin: Skin is warm and dry. Capillary refill takes less than 2 seconds.   Psychiatric: She has a normal mood and affect. Her behavior is normal.   Nursing note and vitals reviewed.      Assessment:     1. Menopause    2. Encounter for long-term (current) use of medications    3. Mixed hyperlipidemia    4. Essential hypertension    5. Medication refill    6. Flu vaccine need    7. Chronic obstructive pulmonary disease, unspecified COPD type    8. Right knee pain, unspecified chronicity    9. Multiple joint pain    10. Insomnia, unspecified type        MDM:     I have Reviewed and summarized old records.  I have performed thorough medication reconciliation today and discussed risk and benefits of each medication.  I have reviewed labs ordered new labs, I reviewed imaging from [facility] everywhere which showed arthritis of the " right knee.  and discussed with patient.  All questions were answered.  I am requesting old records and will review them once they are available.  Requesting records from Dr. Uriah Duke on previous total knee replacement of the left knee.    Follow up in about 6 months (around 5/11/2020), or if symptoms worsen or fail to improve, for Knee pain, LAB RESULTS, HTN, HLD.    If no improvement in symptoms or symptoms worsen, advised to call/follow-up at clinic or go to ER. Patient voiced understanding and all questions/concerns were addressed.     DISCLAIMER: This note was compiled by using a speech recognition dictation system and therefore please be aware that typographical / speech recognition errors can and do occur.  Please contact me if you see any errors specifically.    Uriah Saez M.D.         We Offer Telehealth & Same Day Appointments!   Book your Telehealth appointment with me through my nurse or   Clinic appointments on [employer]!    Office: [phone]     Check out my Facebook Page and Follow Me at: CLICK HERE    Check out my website at [employer] by clicking on: CLICK HERE    To Schedule appointments online, go to [employer]: CLICK HERE     Location: [email or web address]    [address] [place] [address]    FAX: [phone]

## 2019-11-11 NOTE — ASSESSMENT & PLAN NOTE
Checking labs.  Patient cannot take anti-inflammatories due to GI upset.  Offered alternatives.  Will start Voltaren gel.  Offered steroid injection which patient declined at this visit.  ER precautions.

## 2019-11-11 NOTE — ASSESSMENT & PLAN NOTE
Discussed hypertension disease course, DASH-diet and exercise.  Discussed medication regimen importance of treating high blood pressure.  Patient advised of risk of untreated blood pressure.    ER precautions were given for symptoms of hypertensive urgency and emergency.

## 2019-11-12 RX ORDER — CALCIUM CARBONATE 500(1250)
1 TABLET ORAL 3 TIMES DAILY
Qty: 90 TABLET | Refills: 11 | Status: SHIPPED | OUTPATIENT
Start: 2019-11-12 | End: 2022-08-01 | Stop reason: SDUPTHER

## 2019-12-03 RX ORDER — BUTALBITAL, ACETAMINOPHEN AND CAFFEINE 50; 325; 40 MG/1; MG/1; MG/1
TABLET ORAL
Qty: 30 TABLET | Refills: 0 | Status: SHIPPED | OUTPATIENT
Start: 2019-12-03 | End: 2020-01-04

## 2020-01-04 RX ORDER — BUTALBITAL, ACETAMINOPHEN AND CAFFEINE 50; 325; 40 MG/1; MG/1; MG/1
TABLET ORAL
Qty: 30 TABLET | Refills: 5 | Status: SHIPPED | OUTPATIENT
Start: 2020-01-04 | End: 2020-05-05

## 2020-01-21 ENCOUNTER — TELEPHONE (OUTPATIENT)
Dept: FAMILY MEDICINE | Facility: CLINIC | Age: 70
End: 2020-01-21

## 2020-01-21 NOTE — TELEPHONE ENCOUNTER
Called and rescheduled the time of patient's appointment on 05/19/2020, patient verbalized understanding of new appointment time.

## 2020-01-28 DIAGNOSIS — Z79.899 ENCOUNTER FOR LONG-TERM (CURRENT) USE OF MEDICATIONS: ICD-10-CM

## 2020-01-28 DIAGNOSIS — Z76.0 MEDICATION REFILL: ICD-10-CM

## 2020-01-28 DIAGNOSIS — G47.00 INSOMNIA, UNSPECIFIED TYPE: ICD-10-CM

## 2020-01-28 DIAGNOSIS — M79.7 FIBROMYALGIA: ICD-10-CM

## 2020-01-28 RX ORDER — ALPRAZOLAM 0.5 MG/1
TABLET ORAL
Qty: 30 TABLET | Refills: 0 | Status: SHIPPED | OUTPATIENT
Start: 2020-01-28 | End: 2020-03-03

## 2020-01-28 RX ORDER — CYCLOBENZAPRINE HCL 10 MG
TABLET ORAL
Qty: 60 TABLET | Refills: 3 | Status: SHIPPED | OUTPATIENT
Start: 2020-01-28 | End: 2020-03-07 | Stop reason: CLARIF

## 2020-03-03 DIAGNOSIS — Z79.899 ENCOUNTER FOR LONG-TERM (CURRENT) USE OF MEDICATIONS: ICD-10-CM

## 2020-03-03 DIAGNOSIS — Z76.0 MEDICATION REFILL: ICD-10-CM

## 2020-03-03 DIAGNOSIS — G47.00 INSOMNIA, UNSPECIFIED TYPE: ICD-10-CM

## 2020-03-03 RX ORDER — ALPRAZOLAM 0.5 MG/1
TABLET ORAL
Qty: 30 TABLET | Refills: 5 | Status: SHIPPED | OUTPATIENT
Start: 2020-03-03 | End: 2020-07-29

## 2020-03-07 RX ORDER — TIZANIDINE 4 MG/1
4 TABLET ORAL EVERY 6 HOURS PRN
Qty: 60 TABLET | Refills: 0 | Status: SHIPPED | OUTPATIENT
Start: 2020-03-07 | End: 2020-04-06

## 2020-04-07 ENCOUNTER — TELEPHONE (OUTPATIENT)
Dept: FAMILY MEDICINE | Facility: CLINIC | Age: 70
End: 2020-04-07

## 2020-04-07 RX ORDER — CLINDAMYCIN HYDROCHLORIDE 300 MG/1
300 CAPSULE ORAL 3 TIMES DAILY
Qty: 30 CAPSULE | Refills: 0 | Status: SHIPPED | OUTPATIENT
Start: 2020-04-07 | End: 2020-04-17

## 2020-04-07 NOTE — TELEPHONE ENCOUNTER
----- Message from Nancy Haddad sent at 4/7/2020 12:58 PM CDT -----  Contact: pt   Type:  Needs Medical Advice    Who Called: pt  Symptoms (please be specific): swelling in face from tooth pain  How long has patient had these symptoms:   A week  Pharmacy name and phone #:  Listed below   Would the patient rather a call back or a response via [facility]? Call back   Best Call Back Number: [phone]  Additional Information:       [facility] - [place] - [address]  [address]  [place] [address]  Phone: [phone] Fax: [phone]

## 2020-05-05 RX ORDER — BUTALBITAL, ACETAMINOPHEN AND CAFFEINE 50; 325; 40 MG/1; MG/1; MG/1
TABLET ORAL
Qty: 30 TABLET | Refills: 5 | Status: SHIPPED | OUTPATIENT
Start: 2020-05-05 | End: 2020-12-03

## 2020-05-26 ENCOUNTER — TELEPHONE (OUTPATIENT)
Dept: FAMILY MEDICINE | Facility: CLINIC | Age: 70
End: 2020-05-26

## 2020-05-26 DIAGNOSIS — Z12.31 ENCOUNTER FOR SCREENING MAMMOGRAM FOR BREAST CANCER: Primary | ICD-10-CM

## 2020-05-26 NOTE — TELEPHONE ENCOUNTER
----- Message from [place] sent at 5/26/2020 10:38 AM CDT -----  Contact: self/[phone]  Would like to consult with nurse regarding scheduling an Mammo, please call back at [phone]. Thanks/ar

## 2020-05-28 NOTE — TELEPHONE ENCOUNTER
Attempted to call patient but received a message that voicemail has not been set up yet.  Unable to reach patient nor leave a message.  Mammogram order sent to Dr. Saez to sign in preparation of setting up mammogram for the patient per earlier notes in chart.

## 2020-06-12 ENCOUNTER — PATIENT OUTREACH (OUTPATIENT)
Dept: ADMINISTRATIVE | Facility: HOSPITAL | Age: 70
End: 2020-06-12

## 2020-06-12 NOTE — PROGRESS NOTES
Health Maintenance Updated.   Immunizations: Abstracted.   Care Everywhere: Abstracted: No new results found.     Health Maintenance Due   Topic    TETANUS VACCINE     Mammogram

## 2020-06-15 ENCOUNTER — LAB VISIT (OUTPATIENT)
Dept: LAB | Facility: HOSPITAL | Age: 70
End: 2020-06-15
Attending: FAMILY MEDICINE
Payer: MEDICARE

## 2020-06-15 ENCOUNTER — OFFICE VISIT (OUTPATIENT)
Dept: FAMILY MEDICINE | Facility: CLINIC | Age: 70
End: 2020-06-15
Payer: MEDICARE

## 2020-06-15 VITALS
WEIGHT: 224.19 LBS | DIASTOLIC BLOOD PRESSURE: 64 MMHG | BODY MASS INDEX: 41.26 KG/M2 | HEART RATE: 90 BPM | HEIGHT: 62 IN | SYSTOLIC BLOOD PRESSURE: 136 MMHG | TEMPERATURE: 99 F

## 2020-06-15 DIAGNOSIS — G89.29 CHRONIC PAIN OF RIGHT KNEE: Primary | ICD-10-CM

## 2020-06-15 DIAGNOSIS — Z79.899 ENCOUNTER FOR LONG-TERM (CURRENT) USE OF MEDICATIONS: Chronic | ICD-10-CM

## 2020-06-15 DIAGNOSIS — J45.20 MILD INTERMITTENT ASTHMA WITHOUT COMPLICATION: ICD-10-CM

## 2020-06-15 DIAGNOSIS — E66.01 MORBID OBESITY WITH BMI OF 40.0-44.9, ADULT: ICD-10-CM

## 2020-06-15 DIAGNOSIS — M25.561 CHRONIC PAIN OF RIGHT KNEE: Primary | ICD-10-CM

## 2020-06-15 PROBLEM — H92.02 OTALGIA OF LEFT EAR: Status: RESOLVED | Noted: 2017-01-25 | Resolved: 2020-06-15

## 2020-06-15 PROBLEM — E87.1 HYPONATREMIA: Status: RESOLVED | Noted: 2019-08-16 | Resolved: 2020-06-15

## 2020-06-15 PROBLEM — J44.9 CHRONIC OBSTRUCTIVE PULMONARY DISEASE: Status: RESOLVED | Noted: 2019-11-11 | Resolved: 2020-06-15

## 2020-06-15 PROBLEM — Z91.199 NON COMPLIANCE WITH MEDICAL TREATMENT: Status: RESOLVED | Noted: 2018-07-04 | Resolved: 2020-06-15

## 2020-06-15 PROBLEM — E87.6 HYPOKALEMIA: Status: RESOLVED | Noted: 2019-08-16 | Resolved: 2020-06-15

## 2020-06-15 LAB
ALBUMIN SERPL BCP-MCNC: 4 G/DL (ref 3.5–5.2)
ALP SERPL-CCNC: 140 U/L (ref 55–135)
ALT SERPL W/O P-5'-P-CCNC: 51 U/L (ref 10–44)
ANION GAP SERPL CALC-SCNC: 12 MMOL/L (ref 8–16)
AST SERPL-CCNC: 42 U/L (ref 10–40)
BILIRUB SERPL-MCNC: 0.3 MG/DL (ref 0.1–1)
BUN SERPL-MCNC: 14 MG/DL (ref 8–23)
CALCIUM SERPL-MCNC: 9.1 MG/DL (ref 8.7–10.5)
CHLORIDE SERPL-SCNC: 100 MMOL/L (ref 95–110)
CO2 SERPL-SCNC: 23 MMOL/L (ref 23–29)
CREAT SERPL-MCNC: 0.8 MG/DL (ref 0.5–1.4)
EST. GFR  (AFRICAN AMERICAN): >60 ML/MIN/1.73 M^2
EST. GFR  (NON AFRICAN AMERICAN): >60 ML/MIN/1.73 M^2
GLUCOSE SERPL-MCNC: 80 MG/DL (ref 70–110)
POTASSIUM SERPL-SCNC: 4.1 MMOL/L (ref 3.5–5.1)
PROT SERPL-MCNC: 7.6 G/DL (ref 6–8.4)
SODIUM SERPL-SCNC: 135 MMOL/L (ref 136–145)
TSH SERPL DL<=0.005 MIU/L-ACNC: 1.84 UIU/ML (ref 0.4–4)

## 2020-06-15 PROCEDURE — 99999 PR PBB SHADOW E&M-EST. PATIENT-LVL IV: CPT | Mod: PBBFAC,,, | Performed by: FAMILY MEDICINE

## 2020-06-15 PROCEDURE — 36415 COLL VENOUS BLD VENIPUNCTURE: CPT | Mod: PO

## 2020-06-15 PROCEDURE — 3078F DIAST BP <80 MM HG: CPT | Mod: CPTII,S$GLB,, | Performed by: FAMILY MEDICINE

## 2020-06-15 PROCEDURE — 3075F SYST BP GE 130 - 139MM HG: CPT | Mod: CPTII,S$GLB,, | Performed by: FAMILY MEDICINE

## 2020-06-15 PROCEDURE — 3075F PR MOST RECENT SYSTOLIC BLOOD PRESS GE 130-139MM HG: ICD-10-PCS | Mod: CPTII,S$GLB,, | Performed by: FAMILY MEDICINE

## 2020-06-15 PROCEDURE — 84443 ASSAY THYROID STIM HORMONE: CPT

## 2020-06-15 PROCEDURE — 1125F AMNT PAIN NOTED PAIN PRSNT: CPT | Mod: S$GLB,,, | Performed by: FAMILY MEDICINE

## 2020-06-15 PROCEDURE — 3078F PR MOST RECENT DIASTOLIC BLOOD PRESSURE < 80 MM HG: ICD-10-PCS | Mod: CPTII,S$GLB,, | Performed by: FAMILY MEDICINE

## 2020-06-15 PROCEDURE — 80053 COMPREHEN METABOLIC PANEL: CPT

## 2020-06-15 PROCEDURE — 99214 OFFICE O/P EST MOD 30 MIN: CPT | Mod: S$GLB,,, | Performed by: FAMILY MEDICINE

## 2020-06-15 PROCEDURE — 99499 UNLISTED E&M SERVICE: CPT | Mod: ,,, | Performed by: FAMILY MEDICINE

## 2020-06-15 PROCEDURE — 99499 RISK ADDL DX/OHS AUDIT: ICD-10-PCS | Mod: ,,, | Performed by: FAMILY MEDICINE

## 2020-06-15 PROCEDURE — 99499 RISK ADDL DX/OHS AUDIT: ICD-10-PCS | Mod: S$GLB,,, | Performed by: FAMILY MEDICINE

## 2020-06-15 PROCEDURE — 1101F PR PT FALLS ASSESS DOC 0-1 FALLS W/OUT INJ PAST YR: ICD-10-PCS | Mod: CPTII,S$GLB,, | Performed by: FAMILY MEDICINE

## 2020-06-15 PROCEDURE — 99999 PR PBB SHADOW E&M-EST. PATIENT-LVL IV: ICD-10-PCS | Mod: PBBFAC,,, | Performed by: FAMILY MEDICINE

## 2020-06-15 PROCEDURE — 99214 PR OFFICE/OUTPT VISIT, EST, LEVL IV, 30-39 MIN: ICD-10-PCS | Mod: S$GLB,,, | Performed by: FAMILY MEDICINE

## 2020-06-15 PROCEDURE — 1101F PT FALLS ASSESS-DOCD LE1/YR: CPT | Mod: CPTII,S$GLB,, | Performed by: FAMILY MEDICINE

## 2020-06-15 PROCEDURE — 1125F PR PAIN SEVERITY QUANTIFIED, PAIN PRESENT: ICD-10-PCS | Mod: S$GLB,,, | Performed by: FAMILY MEDICINE

## 2020-06-15 PROCEDURE — 1159F PR MEDICATION LIST DOCUMENTED IN MEDICAL RECORD: ICD-10-PCS | Mod: S$GLB,,, | Performed by: FAMILY MEDICINE

## 2020-06-15 PROCEDURE — 99499 UNLISTED E&M SERVICE: CPT | Mod: S$GLB,,, | Performed by: FAMILY MEDICINE

## 2020-06-15 PROCEDURE — 1159F MED LIST DOCD IN RCRD: CPT | Mod: S$GLB,,, | Performed by: FAMILY MEDICINE

## 2020-06-15 RX ORDER — CYCLOBENZAPRINE HCL 10 MG
10 TABLET ORAL 2 TIMES DAILY
COMMUNITY
Start: 2020-06-05 | End: 2020-07-31

## 2020-06-15 RX ORDER — TRAMADOL HYDROCHLORIDE 50 MG/1
50 TABLET ORAL EVERY 6 HOURS PRN
Qty: 28 TABLET | Refills: 0 | Status: SHIPPED | OUTPATIENT
Start: 2020-06-15 | End: 2020-07-08 | Stop reason: SDUPTHER

## 2020-06-15 NOTE — PATIENT INSTRUCTIONS
Follow up in about 3 months (around 9/15/2020), or if symptoms worsen or fail to improve, for Med refills, LAB RESULTS.     If no improvement in symptoms or symptoms worsen, please be advised to call MD, follow-up at clinic and/or go to ER if becomes severe.    Uriah Saez M.D.        We Offer TELEHEALTH & Same Day Appointments!   Book your Telehealth appointment with me through my nurse or   Clinic appointments on [employer]!    [address] [place] [address]    Office: [phone]   FAX: [phone]    Check out my Facebook Page and Follow Me at: [email or web address]    Check out my website at [employer] by clicking on: [email or web address]    To Schedule appointments online, go to [employer]: [email or web address]

## 2020-06-15 NOTE — ASSESSMENT & PLAN NOTE
Checking CMP and TSH.  Patient reporting increased fatigue.  Labs stable otherwise.  Plan to repeat November 2020.

## 2020-06-15 NOTE — ASSESSMENT & PLAN NOTE
Discussed diet and exercise.  Patient needs intervention on her right knee so she can resume exercising.

## 2020-06-15 NOTE — PROGRESS NOTES
PLAN:      Problem List Items Addressed This Visit     Encounter for long-term (current) use of medications (Chronic)     Checking CMP and TSH.  Patient reporting increased fatigue.  Labs stable otherwise.  Plan to repeat November 2020.         Relevant Orders    TSH    Comprehensive metabolic panel    Mild intermittent asthma without complication     Continue albuterol as needed.  Follow-up with Pulmonary as scheduled.  ER precautions.         Morbid obesity with BMI of 40.0-44.9, adult     Discussed diet and exercise.  Patient needs intervention on her right knee so she can resume exercising.         Right knee pain - Primary     Start tramadol until patient can get back in with Dr. DUKE and ultimately get knee replaced.         Relevant Medications    traMADoL (ULTRAM) 50 mg tablet        Future Appointments     Date Provider Specialty Appt Notes    6/19/2020  Radiology     7/7/2020 Uriah Duke MD Orthopedics follow up    11/16/2020 Uriah Saez MD Family Medicine 5 mo f/u           Medication List with Changes/Refills   New Medications    TRAMADOL (ULTRAM) 50 MG TABLET    Take 1 tablet (50 mg total) by mouth every 6 (six) hours as needed for Pain.   Current Medications    ALBUTEROL (PROVENTIL HFA) 90 MCG/ACTUATION INHALER    Inhale 2 puffs into the lungs every 6 (six) hours as needed.    ALBUTEROL SULFATE 2.5 MG/0.5 ML NEBU    Take 2.5 mg by nebulization every 6 (six) hours as needed.      ALPRAZOLAM (XANAX) 0.5 MG TABLET    TAKE ONE TABLET BY MOUTH EVERY NIGHT AT BEDTIME AS NEEDED FOR ANXIETY    BUTALBITAL-ACETAMINOPHEN-CAFFEINE -40 MG (FIORICET, ESGIC) -40 MG PER TABLET    TAKE ONE TABLET BY MOUTH EVERY 4 HOURS AS NEEDED    CALCIUM CARBONATE (OS-MARY) 500 MG CALCIUM (1,250 MG) TABLET    Take 1 tablet (500 mg total) by mouth 3 (three) times daily.    CARVEDILOL (COREG) 12.5 MG TABLET    Take 1 tablet (12.5 mg total) by mouth 2 (two) times daily with meals.    CYCLOBENZAPRINE (FLEXERIL) 10  MG TABLET    Take 10 mg by mouth 2 (two) times daily.    DICLOFENAC SODIUM (VOLTAREN) 1 % GEL    Apply small amount (2 grams) to painful joint area 4 times daily as needed    LEVOCETIRIZINE (XYZAL) 5 MG TABLET    Take 1 tablet (5 mg total) by mouth every evening.    MELATONIN 3 MG TAB    Take 3 mg by mouth every evening.      PANTOPRAZOLE (PROTONIX) 40 MG TABLET    Take 1 tablet (40 mg total) by mouth once daily.    PRAVASTATIN (PRAVACHOL) 20 MG TABLET    Take 1 tablet (20 mg total) by mouth once daily.    RIZATRIPTAN (MAXALT) 10 MG TABLET    Take 10 mg by mouth as needed for Migraine.    VENLAFAXINE (EFFEXOR-XR) 75 MG 24 HR CAPSULE    Take 1 capsule (75 mg total) by mouth once daily.       Uriah Saez M.D.     ==========================================================================  Subjective:      Patient ID: Maria M Reeves is a 69 y.o. female.  has a past medical history of Arthritis, Asthma, moderate persistent, Depression, Encounter for long-term (current) use of medications (8/16/2019), Fibromyalgia, GERD (gastroesophageal reflux disease), HBP (high blood pressure), Hyperlipemia, IBS (irritable bowel syndrome), Migraines, and Sleep apnea.     Chief Complaint: Follow-up (4 week f/u) and Knee Pain (right knee )      Problem List Items Addressed This Visit     Encounter for long-term (current) use of medications (Chronic)    Overview     August 16, 2019   Patient is on CHRONIC long-term drug therapy for managed conditions. See medication list. Reports compliance.  No side effects reported.  Routine lab work is being monitored.  Patient does  need refills today.   =======================================================  November 2019:  CHRONIC. Stable. Compliant with medications for managed conditions. See medication list. No SE reported.   Routine lab analysis is being monitored. Refills were addressed.  =======================================================  JUNE 2020:    CHRONIC. Stable. Compliant  with medications for managed conditions. See medication list. No SE reported.   Routine lab analysis is being monitored. Refills were addressed.  Lab Results   Component Value Date    WBC 7.67 11/11/2019    HGB 15.3 11/11/2019    HCT 49.2 (H) 11/11/2019    MCV 96 11/11/2019     11/11/2019       Chemistry        Component Value Date/Time     11/11/2019 1050     05/20/2019    K 4.5 11/11/2019 1050    K 4.4 05/20/2019     11/11/2019 1050     05/20/2019    CO2 27 11/11/2019 1050    CO2 28 05/20/2019    BUN 15 11/11/2019 1050    BUN 15 05/20/2019    CREATININE 0.8 11/11/2019 1050    CREATININE 0.8 05/20/2019    GLU 91 11/11/2019 1050        Component Value Date/Time    CALCIUM 9.9 11/11/2019 1050    CALCIUM 9.4 05/20/2019    ALKPHOS 143 (H) 11/11/2019 1050    ALKPHOS 156 05/20/2019    AST 29 11/11/2019 1050    ALT 26 11/11/2019 1050    ALT 21 05/20/2019    BILITOT 0.4 11/11/2019 1050    ESTGFRAFRICA >60.0 11/11/2019 1050    EGFRNONAA >60.0 11/11/2019 1050        Lab Results   Component Value Date    TSH 1.649 11/11/2019     ======================================================         Current Assessment & Plan     Checking CMP and TSH.  Patient reporting increased fatigue.  Labs stable otherwise.  Plan to repeat November 2020.         Mild intermittent asthma without complication    Overview     Chronic.  Stable.  Patient is albuterol as needed.         Current Assessment & Plan     Continue albuterol as needed.  Follow-up with Pulmonary as scheduled.  ER precautions.         Morbid obesity with BMI of 40.0-44.9, adult    Overview     BMI Readings from Last 3 Encounters:   06/15/20 41.01 kg/m²   11/11/19 39.36 kg/m²   08/16/19 40.24 kg/m²    Chronic.  Worse from previous.  Patient is having significant right knee pain and swelling.  Patient was scheduled to have knee replacement but was unable to get this performed due to COVID           Current Assessment & Plan     Discussed diet and  exercise.  Patient needs intervention on her right knee so she can resume exercising.         Right knee pain - Primary    Overview     =======================================================  JUNE 2020:  Chronic.  Uncontrolled.  Patient was scheduled to have right knee replacement by Dr. DUKE prior to COVID.  Patient was postpone and is still having significant pain.  Patient is only taking Tylenol over-the-counter for the pain.  Also using diclofenac gel.  With minimal relief.The patient was checked in the [facility] Board of Pharmacy's  Prescription Monitoring Program. There appears to be no incongruities with the patient's verbalized history.     ======================================================    Onset:  Acute on chronic  Duration:  Months to years  Location:  Right knee, patient is status post left knee replacement several years ago.  Patient has osteoarthritis in the right knee.  Previously performed by Dr. Uriah Duke.  Treatments:  Over-the-counter pain relievers including Tylenol, Flexeril patient reports that she cannot take anti-inflammatories due to GI upset.  Previous episodes:  Yes patient has had steroid injections before but they never help, patient has been to physical therapy.  No trauma reported.           Current Assessment & Plan     Start tramadol until patient can get back in with Dr. DUKE and ultimately get knee replaced.                Past Medical History:  Past Medical History:   Diagnosis Date    Arthritis     Asthma, moderate persistent     Depression     Encounter for long-term (current) use of medications 8/16/2019 August 16, 2019  Patient is on CHRONIC long-term drug therapy for managed conditions. See medication list. Reports compliance.  No side effects reported.  Routine lab work is being monitored.  Patient does  need refills today.   Lab Results Component Value Date  WBC 8.00 01/15/2013  HGB 13.6 01/15/2013  HCT 41.2 01/15/2013  MCV 86.2 01/15/2013    01/15/2013   CMP Sodium Date Value Ref Range     Fibromyalgia     GERD (gastroesophageal reflux disease)     HBP (high blood pressure)     Hyperlipemia     IBS (irritable bowel syndrome)     Migraines     Sleep apnea      Past Surgical History:   Procedure Laterality Date    bleeding ulcer      HYSTERECTOMY      JOINT REPLACEMENT      TONSILLECTOMY      TOTAL KNEE ARTHROPLASTY Left 2018    Dr. Duke     Review of patient's allergies indicates:   Allergen Reactions    Budesonide-formoterol Shortness Of Breath     Throat closes up     Duloxetine Shortness Of Breath and Swelling    Meloxicam Shortness Of Breath and Swelling    Simvastatin Shortness Of Breath and Swelling    Zoloft [sertraline] Anaphylaxis     Tongue swelling    Amoxicillin-pot clavulanate Diarrhea    Histamine h2 inhibitors Other (See Comments)     Constipation    Metoclopramide Nausea And Vomiting    Metoprolol Other (See Comments)     Cough    Phenothiazines Other (See Comments)     Not sure of reaction.     Medication List with Changes/Refills   New Medications    TRAMADOL (ULTRAM) 50 MG TABLET    Take 1 tablet (50 mg total) by mouth every 6 (six) hours as needed for Pain.   Current Medications    ALBUTEROL (PROVENTIL HFA) 90 MCG/ACTUATION INHALER    Inhale 2 puffs into the lungs every 6 (six) hours as needed.    ALBUTEROL SULFATE 2.5 MG/0.5 ML NEBU    Take 2.5 mg by nebulization every 6 (six) hours as needed.      ALPRAZOLAM (XANAX) 0.5 MG TABLET    TAKE ONE TABLET BY MOUTH EVERY NIGHT AT BEDTIME AS NEEDED FOR ANXIETY    BUTALBITAL-ACETAMINOPHEN-CAFFEINE -40 MG (FIORICET, ESGIC) -40 MG PER TABLET    TAKE ONE TABLET BY MOUTH EVERY 4 HOURS AS NEEDED    CALCIUM CARBONATE (OS-MARY) 500 MG CALCIUM (1,250 MG) TABLET    Take 1 tablet (500 mg total) by mouth 3 (three) times daily.    CARVEDILOL (COREG) 12.5 MG TABLET    Take 1 tablet (12.5 mg total) by mouth 2 (two) times daily with meals.    CYCLOBENZAPRINE  "(FLEXERIL) 10 MG TABLET    Take 10 mg by mouth 2 (two) times daily.    DICLOFENAC SODIUM (VOLTAREN) 1 % GEL    Apply small amount (2 grams) to painful joint area 4 times daily as needed    LEVOCETIRIZINE (XYZAL) 5 MG TABLET    Take 1 tablet (5 mg total) by mouth every evening.    MELATONIN 3 MG TAB    Take 3 mg by mouth every evening.      PANTOPRAZOLE (PROTONIX) 40 MG TABLET    Take 1 tablet (40 mg total) by mouth once daily.    PRAVASTATIN (PRAVACHOL) 20 MG TABLET    Take 1 tablet (20 mg total) by mouth once daily.    RIZATRIPTAN (MAXALT) 10 MG TABLET    Take 10 mg by mouth as needed for Migraine.    VENLAFAXINE (EFFEXOR-XR) 75 MG 24 HR CAPSULE    Take 1 capsule (75 mg total) by mouth once daily.      Social History     Tobacco Use    Smoking status: Never Smoker    Smokeless tobacco: Never Used   Substance Use Topics    Alcohol use: No      Family History   Problem Relation Age of Onset    COPD Maternal Uncle         colon       I have reviewed the complete PMH, social history, surgical history, allergies and medications.  As well as family history.    Review of Systems   Constitutional: Negative for activity change and fatigue.   HENT: Negative for congestion and sinus pain.    Eyes: Negative for visual disturbance.   Respiratory: Negative for chest tightness and shortness of breath.    Cardiovascular: Negative for palpitations and leg swelling.   Gastrointestinal: Negative for abdominal pain, diarrhea and nausea.   Endocrine: Negative for polyuria.   Genitourinary: Negative for difficulty urinating and frequency.   Musculoskeletal: Positive for arthralgias and joint swelling.   Skin: Negative for rash.   Neurological: Negative for dizziness and headaches.   Psychiatric/Behavioral: Negative for agitation. The patient is not nervous/anxious.      Objective:   /64   Pulse 90   Temp 98.7 °F (37.1 °C) (Oral)   Ht 5' 2" (1.575 m)   Wt 101.7 kg (224 lb 3.2 oz)   BMI 41.01 kg/m²   Physical Exam  Vitals " signs and nursing note reviewed.   Constitutional:       General: She is not in acute distress.     Appearance: She is well-developed.   HENT:      Head: Normocephalic and atraumatic.   Eyes:      Pupils: Pupils are equal, round, and reactive to light.   Neck:      Musculoskeletal: Normal range of motion and neck supple.   Cardiovascular:      Rate and Rhythm: Normal rate and regular rhythm.      Heart sounds: Normal heart sounds. No murmur.   Pulmonary:      Effort: Pulmonary effort is normal. No respiratory distress.      Breath sounds: Normal breath sounds. No wheezing.   Musculoskeletal: Normal range of motion.         General: Tenderness (Right knee medial joint tenderness, crepitus noted.) present.   Skin:     General: Skin is warm and dry.      Capillary Refill: Capillary refill takes less than 2 seconds.   Neurological:      Mental Status: She is alert and oriented to person, place, and time.      Cranial Nerves: No cranial nerve deficit.   Psychiatric:         Behavior: Behavior normal.         Assessment:     1. Chronic pain of right knee    2. Encounter for long-term (current) use of medications    3. Morbid obesity with BMI of 40.0-44.9, adult    4. Mild intermittent asthma without complication      MDM:   Moderate complexity.  Moderate risk.  I have Reviewed and summarized old records.  I have performed thorough medication reconciliation today and discussed risk and benefits of each medication.  I have reviewed labs and discussed with patient.  All questions were answered.    I have signed for the following orders AND/OR meds.  Orders Placed This Encounter   Procedures    TSH     Standing Status:   Future     Number of Occurrences:   1     Standing Expiration Date:   8/14/2021    Comprehensive metabolic panel     Standing Status:   Standing     Number of Occurrences:   99     Standing Expiration Date:   6/10/2040     Medications Ordered This Encounter   Medications    traMADoL (ULTRAM) 50 mg tablet      Sig: Take 1 tablet (50 mg total) by mouth every 6 (six) hours as needed for Pain.     Dispense:  28 tablet     Refill:  0     Quantity prescribed more than 7 day supply? No        Follow up in about 5 months (around 11/15/2020), or if symptoms worsen or fail to improve, for Med refills, LAB RESULTS.    If no improvement in symptoms or symptoms worsen, advised to call/follow-up at clinic or go to ER. Patient voiced understanding and all questions/concerns were addressed.     DISCLAIMER: This note was compiled by using a speech recognition dictation system and therefore please be aware that typographical / speech recognition errors can and do occur.  Please contact me if you see any errors specifically.    Uriah Saez M.D.       Office: [phone] [address] [place] [address]  FAX: [phone]

## 2020-06-16 DIAGNOSIS — R74.8 ELEVATED LIVER ENZYMES: Primary | ICD-10-CM

## 2020-06-16 NOTE — PROGRESS NOTES
Please CALL patient with results and Document verification.   [phone]    Patient has a change in her liver enzymes that are elevated.  Set up ultrasound of the liver.  And have patient follow-up after this test.  Thyroid is normal.

## 2020-06-29 DIAGNOSIS — Z96.652 STATUS POST TOTAL LEFT KNEE REPLACEMENT: ICD-10-CM

## 2020-06-29 DIAGNOSIS — M25.561 RIGHT KNEE PAIN, UNSPECIFIED CHRONICITY: Primary | ICD-10-CM

## 2020-07-05 ENCOUNTER — PATIENT OUTREACH (OUTPATIENT)
Dept: ADMINISTRATIVE | Facility: OTHER | Age: 70
End: 2020-07-05

## 2020-07-06 ENCOUNTER — TELEPHONE (OUTPATIENT)
Dept: ORTHOPEDICS | Facility: CLINIC | Age: 70
End: 2020-07-06

## 2020-07-06 NOTE — TELEPHONE ENCOUNTER
Called pt back. She states that she had to reschedule her appt from tomorrow to next Tuesday. Her transportation is not available tomorrow. Pt wanted to know if there was anything Dr. Duke could call in something for her? Advised that she would need to be seen in clinic in order to get a prescription. Scheduled second appt for this Thursday, kept the appt for next week. Pt will call back if cannot come in Thursday. Advised pt to ice and elevate the knee for the swelling. Pt states that it has been going on for 1 year, it is getting worse. Thanks, Oly

## 2020-07-06 NOTE — TELEPHONE ENCOUNTER
----- Message from Alejandrina Francis sent at 7/6/2020  1:25 PM CDT -----  Contact: self/[phone]  .1 Patient would like to get medical advice.  Symptoms (please be specific): inflammation of the right knee  How long has patient had these symptoms: 6 months  [facility] phone#:[facility] [place] [place] - [address] [phone] (Phone) [phone] (Fax)   Any drug allergies: onfile  Comments: Patient would like to get medical advice.

## 2020-07-08 ENCOUNTER — TELEPHONE (OUTPATIENT)
Dept: FAMILY MEDICINE | Facility: CLINIC | Age: 70
End: 2020-07-08

## 2020-07-08 NOTE — TELEPHONE ENCOUNTER
----- Message from Katty Hartman sent at 7/8/2020 11:18 AM CDT -----  Contact: Maria M Franz would like a call back at [phone], Regards to a letter she got about test results.    Thanks  Td

## 2020-10-22 ENCOUNTER — TELEPHONE (OUTPATIENT)
Dept: FAMILY MEDICINE | Facility: CLINIC | Age: 70
End: 2020-10-22

## 2020-10-22 NOTE — TELEPHONE ENCOUNTER
----- Message from Jodee Saldana sent at 10/22/2020  1:53 PM CDT -----  Regarding: reschedule  Type:  Needs Medical Advice    Who Called: pt  Symptoms (please be specific): n/a   How long has patient had these symptoms:  n/a  Pharmacy name and phone #:  n/a  Would the patient rather a call back or a response via [facility]? Call back  Best Call Back Number: [phone]  Additional Information: Pt had to cancel appt and needs assistance with rescheduling. Please call back. Thanks

## 2020-10-23 ENCOUNTER — TELEPHONE (OUTPATIENT)
Dept: FAMILY MEDICINE | Facility: CLINIC | Age: 70
End: 2020-10-23

## 2020-10-23 NOTE — TELEPHONE ENCOUNTER
----- Message from Kaycee Jesus sent at 10/23/2020 12:10 PM CDT -----  Regarding: appointment access/med refill  Contact: Patient  Patient would like a call back concerning getting an appointment to see doctor Se, the next available was 12/30/20, patient declined and would prefer something sooner so she can get a refill on her Tramadol. Please call at  .[phone] (home) [phone] (work)      .Type:  RX Refill Request    Who Called: Patient  Refill or New Rx: refill  RX Name and Strength: traMADoL (ULTRAM) 50 mg tablet  How is the patient currently taking it? (ex. 1XDay):  Is this a 30 day or 90 day RX:   Preferred Pharmacy with phone number:     [facility] on [facility] in  [place], [place]  [phone]    Local or Mail Order:Local  Ordering Provider: Dr Saez  Would the patient rather a call back or a response via [facility]? Call  Best Call Back Number:.[phone] (home) [phone] (work)    Additional Information:

## 2020-10-23 NOTE — TELEPHONE ENCOUNTER
Attempted to return call to patient [phone] unable to leave a message as voicemail not set up, called [phone] and was told by a man that Mrs. Franz no longer lives there.

## 2020-10-26 DIAGNOSIS — Z76.0 MEDICATION REFILL: ICD-10-CM

## 2020-10-26 DIAGNOSIS — G47.00 INSOMNIA, UNSPECIFIED TYPE: ICD-10-CM

## 2020-10-26 DIAGNOSIS — Z79.899 ENCOUNTER FOR LONG-TERM (CURRENT) USE OF MEDICATIONS: ICD-10-CM

## 2020-10-27 RX ORDER — CYCLOBENZAPRINE HCL 10 MG
TABLET ORAL
Qty: 60 TABLET | Refills: 1 | Status: SHIPPED | OUTPATIENT
Start: 2020-10-27 | End: 2020-12-30

## 2020-10-27 RX ORDER — LEVOCETIRIZINE DIHYDROCHLORIDE 5 MG/1
TABLET, FILM COATED ORAL
Qty: 30 TABLET | Refills: 11 | Status: SHIPPED | OUTPATIENT
Start: 2020-10-27 | End: 2022-01-05

## 2020-10-27 RX ORDER — VENLAFAXINE HYDROCHLORIDE 75 MG/1
CAPSULE, EXTENDED RELEASE ORAL
Qty: 90 CAPSULE | Refills: 4 | Status: SHIPPED | OUTPATIENT
Start: 2020-10-27 | End: 2021-09-07 | Stop reason: SDUPTHER

## 2020-10-27 RX ORDER — ALPRAZOLAM 0.5 MG/1
TABLET ORAL
Qty: 30 TABLET | Refills: 1 | Status: SHIPPED | OUTPATIENT
Start: 2020-10-27 | End: 2020-12-30

## 2020-10-27 RX ORDER — PRAVASTATIN SODIUM 20 MG/1
TABLET ORAL
Qty: 90 TABLET | Refills: 4 | Status: SHIPPED | OUTPATIENT
Start: 2020-10-27 | End: 2021-09-07 | Stop reason: SDUPTHER

## 2020-10-27 RX ORDER — CARVEDILOL 12.5 MG/1
TABLET ORAL
Qty: 180 TABLET | Refills: 4 | Status: SHIPPED | OUTPATIENT
Start: 2020-10-27 | End: 2021-11-15

## 2020-11-19 ENCOUNTER — TELEPHONE (OUTPATIENT)
Dept: FAMILY MEDICINE | Facility: CLINIC | Age: 70
End: 2020-11-19

## 2020-11-19 NOTE — TELEPHONE ENCOUNTER
----- Message from Nena Carlos sent at 11/19/2020  1:11 PM CST -----  Regarding: sooner appt  Contact: [phone]  Type:  Sooner Apoointment Request    Caller is requesting a sooner appointment.  Caller declined first available appointment listed below.  Caller will not accept being placed on the waitlist and is requesting a message be sent to doctor.  Name of Caller: pt   When is the first available appointment?12/31/2020  Symptoms:annual and refill and knee pain right shoulder pain   Would the patient rather a call back or a response via [facility]?  Call back   Best Call Back Number:[phone]  Additional Information:

## 2020-11-19 NOTE — TELEPHONE ENCOUNTER
----- Message from Irish Farris sent at 11/19/2020  1:34 PM CST -----  Regarding: KNEE ISSUES  Contact: PATIENT  Type:  Patient Returning Call    Who Called:PATIENT  Who Left Message for Patient:NURSE  Does the patient know what this is regarding?:KNEE  Would the patient rather a call back or a response via [facility] CALL  Best Call Back Number:[phone]  Additional Information: PLEASE CALL BACK

## 2020-11-19 NOTE — TELEPHONE ENCOUNTER
Attempted to return patient's call, unable to leave a message as received a message that voicemail has not yet been set up.

## 2020-11-19 NOTE — TELEPHONE ENCOUNTER
Attempted to call patient, no answer and voice mail has not been set up so I could not leave a message.

## 2020-12-04 DIAGNOSIS — Z79.899 ENCOUNTER FOR LONG-TERM (CURRENT) USE OF MEDICATIONS: ICD-10-CM

## 2020-12-04 DIAGNOSIS — Z76.0 MEDICATION REFILL: ICD-10-CM

## 2020-12-04 RX ORDER — PANTOPRAZOLE SODIUM 40 MG/1
TABLET, DELAYED RELEASE ORAL
Qty: 90 TABLET | Refills: 3 | Status: SHIPPED | OUTPATIENT
Start: 2020-12-04 | End: 2021-11-15

## 2020-12-11 ENCOUNTER — PATIENT MESSAGE (OUTPATIENT)
Dept: OTHER | Facility: OTHER | Age: 70
End: 2020-12-11

## 2020-12-30 ENCOUNTER — PATIENT MESSAGE (OUTPATIENT)
Dept: FAMILY MEDICINE | Facility: CLINIC | Age: 70
End: 2020-12-30

## 2020-12-30 DIAGNOSIS — Z76.0 MEDICATION REFILL: ICD-10-CM

## 2020-12-30 DIAGNOSIS — Z79.899 ENCOUNTER FOR LONG-TERM (CURRENT) USE OF MEDICATIONS: ICD-10-CM

## 2020-12-30 DIAGNOSIS — G47.00 INSOMNIA, UNSPECIFIED TYPE: ICD-10-CM

## 2020-12-30 RX ORDER — ALPRAZOLAM 0.5 MG/1
TABLET ORAL
Qty: 30 TABLET | Refills: 1 | Status: SHIPPED | OUTPATIENT
Start: 2020-12-30 | End: 2021-03-03

## 2020-12-30 RX ORDER — CYCLOBENZAPRINE HCL 10 MG
TABLET ORAL
Qty: 60 TABLET | Refills: 1 | Status: SHIPPED | OUTPATIENT
Start: 2020-12-30 | End: 2021-02-03

## 2020-12-30 NOTE — TELEPHONE ENCOUNTER
My chart message sent to patient regarding a need for an appointment for future medication refills.  An appointment was scheduled for the patient per Dr. Saez's orders and a reminder letter was mailed to address on file.

## 2021-02-03 RX ORDER — CYCLOBENZAPRINE HCL 10 MG
TABLET ORAL
Qty: 60 TABLET | Refills: 3 | Status: SHIPPED | OUTPATIENT
Start: 2021-02-03 | End: 2022-01-05

## 2021-02-08 ENCOUNTER — TELEPHONE (OUTPATIENT)
Dept: FAMILY MEDICINE | Facility: CLINIC | Age: 71
End: 2021-02-08

## 2021-03-03 DIAGNOSIS — Z76.0 MEDICATION REFILL: ICD-10-CM

## 2021-03-03 DIAGNOSIS — Z79.899 ENCOUNTER FOR LONG-TERM (CURRENT) USE OF MEDICATIONS: ICD-10-CM

## 2021-03-03 DIAGNOSIS — G47.00 INSOMNIA, UNSPECIFIED TYPE: ICD-10-CM

## 2021-03-03 RX ORDER — ALPRAZOLAM 0.5 MG/1
TABLET ORAL
Qty: 30 TABLET | Refills: 1 | Status: SHIPPED | OUTPATIENT
Start: 2021-03-03 | End: 2021-05-03

## 2021-03-03 RX ORDER — BUTALBITAL, ACETAMINOPHEN AND CAFFEINE 50; 325; 40 MG/1; MG/1; MG/1
TABLET ORAL
Qty: 30 TABLET | Refills: 1 | Status: SHIPPED | OUTPATIENT
Start: 2021-03-03 | End: 2022-01-05 | Stop reason: SDUPTHER

## 2021-04-05 DIAGNOSIS — Z79.899 ENCOUNTER FOR LONG-TERM (CURRENT) USE OF MEDICATIONS: ICD-10-CM

## 2021-04-05 DIAGNOSIS — Z76.0 MEDICATION REFILL: ICD-10-CM

## 2021-04-05 DIAGNOSIS — G47.00 INSOMNIA, UNSPECIFIED TYPE: ICD-10-CM

## 2021-04-05 RX ORDER — ALPRAZOLAM 0.5 MG/1
TABLET ORAL
Qty: 30 TABLET | Refills: 1 | OUTPATIENT
Start: 2021-04-05

## 2021-04-06 ENCOUNTER — LAB VISIT (OUTPATIENT)
Dept: LAB | Facility: HOSPITAL | Age: 71
End: 2021-04-06
Attending: FAMILY MEDICINE
Payer: MEDICARE

## 2021-04-06 ENCOUNTER — OFFICE VISIT (OUTPATIENT)
Dept: FAMILY MEDICINE | Facility: CLINIC | Age: 71
End: 2021-04-06
Payer: MEDICARE

## 2021-04-06 VITALS
DIASTOLIC BLOOD PRESSURE: 80 MMHG | TEMPERATURE: 98 F | HEIGHT: 62 IN | HEART RATE: 84 BPM | WEIGHT: 226.38 LBS | SYSTOLIC BLOOD PRESSURE: 138 MMHG | BODY MASS INDEX: 41.66 KG/M2

## 2021-04-06 DIAGNOSIS — Z79.899 ENCOUNTER FOR LONG-TERM (CURRENT) USE OF MEDICATIONS: ICD-10-CM

## 2021-04-06 DIAGNOSIS — M25.561 CHRONIC PAIN OF RIGHT KNEE: ICD-10-CM

## 2021-04-06 DIAGNOSIS — Z00.00 WELL ADULT EXAM: ICD-10-CM

## 2021-04-06 DIAGNOSIS — Z13.220 ENCOUNTER FOR LIPID SCREENING FOR CARDIOVASCULAR DISEASE: ICD-10-CM

## 2021-04-06 DIAGNOSIS — J40 BRONCHITIS: ICD-10-CM

## 2021-04-06 DIAGNOSIS — E66.01 MORBID OBESITY WITH BMI OF 40.0-44.9, ADULT: ICD-10-CM

## 2021-04-06 DIAGNOSIS — F41.1 ANXIETY STATE: ICD-10-CM

## 2021-04-06 DIAGNOSIS — Z13.6 ENCOUNTER FOR LIPID SCREENING FOR CARDIOVASCULAR DISEASE: ICD-10-CM

## 2021-04-06 DIAGNOSIS — G89.29 CHRONIC PAIN OF RIGHT KNEE: ICD-10-CM

## 2021-04-06 DIAGNOSIS — A49.9 BACTERIAL INFECTION: Primary | ICD-10-CM

## 2021-04-06 LAB
ERYTHROCYTE [DISTWIDTH] IN BLOOD BY AUTOMATED COUNT: 12.5 % (ref 11.5–14.5)
ESTIMATED AVG GLUCOSE: 103 MG/DL (ref 68–131)
HBA1C MFR BLD: 5.2 % (ref 4–5.6)
HCT VFR BLD AUTO: 42.1 % (ref 37–48.5)
HGB BLD-MCNC: 13.7 G/DL (ref 12–16)
MCH RBC QN AUTO: 30.3 PG (ref 27–31)
MCHC RBC AUTO-ENTMCNC: 32.5 G/DL (ref 32–36)
MCV RBC AUTO: 93 FL (ref 82–98)
PLATELET # BLD AUTO: 354 K/UL (ref 150–450)
PMV BLD AUTO: 9.4 FL (ref 9.2–12.9)
RBC # BLD AUTO: 4.52 M/UL (ref 4–5.4)
WBC # BLD AUTO: 9.92 K/UL (ref 3.9–12.7)

## 2021-04-06 PROCEDURE — 3008F PR BODY MASS INDEX (BMI) DOCUMENTED: ICD-10-PCS | Mod: CPTII,S$GLB,, | Performed by: FAMILY MEDICINE

## 2021-04-06 PROCEDURE — 3008F BODY MASS INDEX DOCD: CPT | Mod: CPTII,S$GLB,, | Performed by: FAMILY MEDICINE

## 2021-04-06 PROCEDURE — 36415 COLL VENOUS BLD VENIPUNCTURE: CPT | Mod: PO | Performed by: FAMILY MEDICINE

## 2021-04-06 PROCEDURE — 1101F PT FALLS ASSESS-DOCD LE1/YR: CPT | Mod: CPTII,S$GLB,, | Performed by: FAMILY MEDICINE

## 2021-04-06 PROCEDURE — 1125F PR PAIN SEVERITY QUANTIFIED, PAIN PRESENT: ICD-10-PCS | Mod: S$GLB,,, | Performed by: FAMILY MEDICINE

## 2021-04-06 PROCEDURE — 1101F PR PT FALLS ASSESS DOC 0-1 FALLS W/OUT INJ PAST YR: ICD-10-PCS | Mod: CPTII,S$GLB,, | Performed by: FAMILY MEDICINE

## 2021-04-06 PROCEDURE — 99999 PR PBB SHADOW E&M-EST. PATIENT-LVL IV: ICD-10-PCS | Mod: PBBFAC,,, | Performed by: FAMILY MEDICINE

## 2021-04-06 PROCEDURE — 99214 PR OFFICE/OUTPT VISIT, EST, LEVL IV, 30-39 MIN: ICD-10-PCS | Mod: S$GLB,,, | Performed by: FAMILY MEDICINE

## 2021-04-06 PROCEDURE — 1159F PR MEDICATION LIST DOCUMENTED IN MEDICAL RECORD: ICD-10-PCS | Mod: S$GLB,,, | Performed by: FAMILY MEDICINE

## 2021-04-06 PROCEDURE — 3288F PR FALLS RISK ASSESSMENT DOCUMENTED: ICD-10-PCS | Mod: CPTII,S$GLB,, | Performed by: FAMILY MEDICINE

## 2021-04-06 PROCEDURE — 85027 COMPLETE CBC AUTOMATED: CPT | Performed by: FAMILY MEDICINE

## 2021-04-06 PROCEDURE — 99999 PR PBB SHADOW E&M-EST. PATIENT-LVL IV: CPT | Mod: PBBFAC,,, | Performed by: FAMILY MEDICINE

## 2021-04-06 PROCEDURE — 99214 OFFICE O/P EST MOD 30 MIN: CPT | Mod: S$GLB,,, | Performed by: FAMILY MEDICINE

## 2021-04-06 PROCEDURE — 84443 ASSAY THYROID STIM HORMONE: CPT | Performed by: FAMILY MEDICINE

## 2021-04-06 PROCEDURE — 3288F FALL RISK ASSESSMENT DOCD: CPT | Mod: CPTII,S$GLB,, | Performed by: FAMILY MEDICINE

## 2021-04-06 PROCEDURE — 99499 RISK ADDL DX/OHS AUDIT: ICD-10-PCS | Mod: S$GLB,,, | Performed by: FAMILY MEDICINE

## 2021-04-06 PROCEDURE — 1159F MED LIST DOCD IN RCRD: CPT | Mod: S$GLB,,, | Performed by: FAMILY MEDICINE

## 2021-04-06 PROCEDURE — 1125F AMNT PAIN NOTED PAIN PRSNT: CPT | Mod: S$GLB,,, | Performed by: FAMILY MEDICINE

## 2021-04-06 PROCEDURE — 80061 LIPID PANEL: CPT | Performed by: FAMILY MEDICINE

## 2021-04-06 PROCEDURE — 99499 UNLISTED E&M SERVICE: CPT | Mod: S$GLB,,, | Performed by: FAMILY MEDICINE

## 2021-04-06 PROCEDURE — 80053 COMPREHEN METABOLIC PANEL: CPT | Performed by: FAMILY MEDICINE

## 2021-04-06 PROCEDURE — 83036 HEMOGLOBIN GLYCOSYLATED A1C: CPT | Performed by: FAMILY MEDICINE

## 2021-04-06 RX ORDER — DOXYCYCLINE 100 MG/1
100 CAPSULE ORAL EVERY 12 HOURS
Qty: 20 CAPSULE | Refills: 0 | Status: SHIPPED | OUTPATIENT
Start: 2021-04-06 | End: 2021-12-13

## 2021-04-06 RX ORDER — PROMETHAZINE HYDROCHLORIDE AND CODEINE PHOSPHATE 6.25; 1 MG/5ML; MG/5ML
5 SOLUTION ORAL EVERY 4 HOURS PRN
Qty: 240 ML | Refills: 0 | Status: SHIPPED | OUTPATIENT
Start: 2021-04-06 | End: 2021-04-16

## 2021-04-06 RX ORDER — MONTELUKAST SODIUM 10 MG/1
10 TABLET ORAL NIGHTLY
Qty: 30 TABLET | Refills: 0 | Status: SHIPPED | OUTPATIENT
Start: 2021-04-06 | End: 2021-05-06

## 2021-04-06 RX ORDER — PREDNISONE 20 MG/1
20 TABLET ORAL DAILY
Qty: 5 TABLET | Refills: 0 | Status: SHIPPED | OUTPATIENT
Start: 2021-04-06 | End: 2021-04-11

## 2021-04-06 RX ORDER — TRAMADOL HYDROCHLORIDE 50 MG/1
50 TABLET ORAL EVERY 6 HOURS PRN
Qty: 28 TABLET | Refills: 0 | Status: SHIPPED | OUTPATIENT
Start: 2021-04-06 | End: 2022-01-05

## 2021-04-08 PROBLEM — E66.01 MORBID OBESITY WITH BMI OF 40.0-44.9, ADULT: Chronic | Status: ACTIVE | Noted: 2018-07-03

## 2021-04-08 PROBLEM — J40 BRONCHITIS: Status: ACTIVE | Noted: 2021-04-08

## 2021-04-08 LAB
ALBUMIN SERPL BCP-MCNC: 3.9 G/DL (ref 3.5–5.2)
ALP SERPL-CCNC: 140 U/L (ref 55–135)
ALT SERPL W/O P-5'-P-CCNC: 22 U/L (ref 10–44)
ANION GAP SERPL CALC-SCNC: 10 MMOL/L (ref 8–16)
AST SERPL-CCNC: 30 U/L (ref 10–40)
BILIRUB SERPL-MCNC: 0.5 MG/DL (ref 0.1–1)
BUN SERPL-MCNC: 10 MG/DL (ref 8–23)
CALCIUM SERPL-MCNC: 8.9 MG/DL (ref 8.7–10.5)
CHLORIDE SERPL-SCNC: 105 MMOL/L (ref 95–110)
CHOLEST SERPL-MCNC: 181 MG/DL (ref 120–199)
CHOLEST/HDLC SERPL: 3.1 {RATIO} (ref 2–5)
CO2 SERPL-SCNC: 26 MMOL/L (ref 23–29)
CREAT SERPL-MCNC: 0.8 MG/DL (ref 0.5–1.4)
EST. GFR  (AFRICAN AMERICAN): >60 ML/MIN/1.73 M^2
EST. GFR  (NON AFRICAN AMERICAN): >60 ML/MIN/1.73 M^2
GLUCOSE SERPL-MCNC: 108 MG/DL (ref 70–110)
HDLC SERPL-MCNC: 58 MG/DL (ref 40–75)
HDLC SERPL: 32 % (ref 20–50)
LDLC SERPL CALC-MCNC: 109.4 MG/DL (ref 63–159)
NONHDLC SERPL-MCNC: 123 MG/DL
POTASSIUM SERPL-SCNC: 4.2 MMOL/L (ref 3.5–5.1)
PROT SERPL-MCNC: 7.3 G/DL (ref 6–8.4)
SODIUM SERPL-SCNC: 141 MMOL/L (ref 136–145)
TRIGL SERPL-MCNC: 68 MG/DL (ref 30–150)
TSH SERPL DL<=0.005 MIU/L-ACNC: 1.68 UIU/ML (ref 0.4–4)

## 2021-05-21 ENCOUNTER — TELEPHONE (OUTPATIENT)
Dept: FAMILY MEDICINE | Facility: CLINIC | Age: 71
End: 2021-05-21

## 2021-05-27 ENCOUNTER — TELEPHONE (OUTPATIENT)
Dept: ADMINISTRATIVE | Facility: HOSPITAL | Age: 71
End: 2021-05-27

## 2021-06-11 ENCOUNTER — PATIENT OUTREACH (OUTPATIENT)
Dept: ADMINISTRATIVE | Facility: OTHER | Age: 71
End: 2021-06-11

## 2021-06-11 ENCOUNTER — PATIENT MESSAGE (OUTPATIENT)
Dept: ADMINISTRATIVE | Facility: OTHER | Age: 71
End: 2021-06-11

## 2021-06-17 ENCOUNTER — PATIENT OUTREACH (OUTPATIENT)
Dept: ADMINISTRATIVE | Facility: HOSPITAL | Age: 71
End: 2021-06-17

## 2021-07-12 DIAGNOSIS — M25.561 CHRONIC PAIN OF RIGHT KNEE: Primary | ICD-10-CM

## 2021-07-12 DIAGNOSIS — G89.29 CHRONIC PAIN OF RIGHT KNEE: Primary | ICD-10-CM

## 2021-07-15 ENCOUNTER — PATIENT OUTREACH (OUTPATIENT)
Dept: ADMINISTRATIVE | Facility: OTHER | Age: 71
End: 2021-07-15

## 2021-07-22 ENCOUNTER — TELEPHONE (OUTPATIENT)
Dept: ORTHOPEDICS | Facility: CLINIC | Age: 71
End: 2021-07-22

## 2021-09-07 ENCOUNTER — OFFICE VISIT (OUTPATIENT)
Dept: FAMILY MEDICINE | Facility: CLINIC | Age: 71
End: 2021-09-07
Payer: MEDICARE

## 2021-09-07 ENCOUNTER — TELEPHONE (OUTPATIENT)
Dept: INTERNAL MEDICINE | Facility: CLINIC | Age: 71
End: 2021-09-07

## 2021-09-07 ENCOUNTER — TELEPHONE (OUTPATIENT)
Dept: FAMILY MEDICINE | Facility: CLINIC | Age: 71
End: 2021-09-07

## 2021-09-07 DIAGNOSIS — R19.7 DIARRHEA, UNSPECIFIED TYPE: Primary | ICD-10-CM

## 2021-09-07 DIAGNOSIS — G47.00 INSOMNIA, UNSPECIFIED TYPE: ICD-10-CM

## 2021-09-07 DIAGNOSIS — Z76.0 MEDICATION REFILL: ICD-10-CM

## 2021-09-07 DIAGNOSIS — N30.00 ACUTE CYSTITIS WITHOUT HEMATURIA: Primary | ICD-10-CM

## 2021-09-07 DIAGNOSIS — Z79.899 ENCOUNTER FOR LONG-TERM (CURRENT) USE OF MEDICATIONS: ICD-10-CM

## 2021-09-07 PROCEDURE — 99441 PR PHYSICIAN TELEPHONE EVALUATION 5-10 MIN: ICD-10-PCS | Mod: 95,,, | Performed by: FAMILY MEDICINE

## 2021-09-07 PROCEDURE — 3044F HG A1C LEVEL LT 7.0%: CPT | Mod: CPTII,95,, | Performed by: FAMILY MEDICINE

## 2021-09-07 PROCEDURE — 3044F PR MOST RECENT HEMOGLOBIN A1C LEVEL <7.0%: ICD-10-PCS | Mod: CPTII,95,, | Performed by: FAMILY MEDICINE

## 2021-09-07 PROCEDURE — 99441 PR PHYSICIAN TELEPHONE EVALUATION 5-10 MIN: CPT | Mod: 95,,, | Performed by: FAMILY MEDICINE

## 2021-09-07 RX ORDER — SULFAMETHOXAZOLE AND TRIMETHOPRIM 800; 160 MG/1; MG/1
1 TABLET ORAL 2 TIMES DAILY
Qty: 20 TABLET | Refills: 0 | Status: SHIPPED | OUTPATIENT
Start: 2021-09-07 | End: 2021-09-17

## 2021-09-07 RX ORDER — ALPRAZOLAM 0.5 MG/1
TABLET ORAL
Qty: 30 TABLET | Refills: 5 | Status: SHIPPED | OUTPATIENT
Start: 2021-09-07 | End: 2022-01-05 | Stop reason: SDUPTHER

## 2021-09-07 RX ORDER — FAMOTIDINE 40 MG/1
40 TABLET, FILM COATED ORAL DAILY
Qty: 30 TABLET | Refills: 0 | Status: SHIPPED | OUTPATIENT
Start: 2021-09-07 | End: 2022-01-05

## 2021-09-07 RX ORDER — PRAVASTATIN SODIUM 20 MG/1
20 TABLET ORAL DAILY
Qty: 90 TABLET | Refills: 4 | Status: SHIPPED | OUTPATIENT
Start: 2021-09-07 | End: 2022-02-17 | Stop reason: SDUPTHER

## 2021-09-07 RX ORDER — VENLAFAXINE HYDROCHLORIDE 75 MG/1
75 CAPSULE, EXTENDED RELEASE ORAL DAILY
Qty: 90 CAPSULE | Refills: 4 | Status: SHIPPED | OUTPATIENT
Start: 2021-09-07 | End: 2022-03-15

## 2021-09-07 RX ORDER — DIPHENOXYLATE HYDROCHLORIDE AND ATROPINE SULFATE 2.5; .025 MG/1; MG/1
1 TABLET ORAL 4 TIMES DAILY PRN
Qty: 30 TABLET | Refills: 0 | Status: SHIPPED | OUTPATIENT
Start: 2021-09-07 | End: 2021-09-17

## 2021-09-10 ENCOUNTER — TELEPHONE (OUTPATIENT)
Dept: FAMILY MEDICINE | Facility: CLINIC | Age: 71
End: 2021-09-10

## 2021-10-01 ENCOUNTER — TELEPHONE (OUTPATIENT)
Dept: ADMINISTRATIVE | Facility: HOSPITAL | Age: 71
End: 2021-10-01

## 2021-11-15 DIAGNOSIS — Z79.899 ENCOUNTER FOR LONG-TERM (CURRENT) USE OF MEDICATIONS: ICD-10-CM

## 2021-11-15 DIAGNOSIS — Z76.0 MEDICATION REFILL: ICD-10-CM

## 2021-11-15 RX ORDER — PANTOPRAZOLE SODIUM 40 MG/1
TABLET, DELAYED RELEASE ORAL
Qty: 90 TABLET | Refills: 4 | Status: SHIPPED | OUTPATIENT
Start: 2021-11-15 | End: 2022-08-01 | Stop reason: SDUPTHER

## 2021-11-15 RX ORDER — CARVEDILOL 12.5 MG/1
TABLET ORAL
Qty: 180 TABLET | Refills: 4 | Status: SHIPPED | OUTPATIENT
Start: 2021-11-15 | End: 2022-08-01 | Stop reason: SDUPTHER

## 2021-11-19 ENCOUNTER — TELEPHONE (OUTPATIENT)
Dept: FAMILY MEDICINE | Facility: CLINIC | Age: 71
End: 2021-11-19
Payer: MEDICARE

## 2021-11-26 ENCOUNTER — PATIENT OUTREACH (OUTPATIENT)
Dept: ADMINISTRATIVE | Facility: OTHER | Age: 71
End: 2021-11-26
Payer: MEDICARE

## 2021-12-13 ENCOUNTER — NURSE TRIAGE (OUTPATIENT)
Dept: ADMINISTRATIVE | Facility: CLINIC | Age: 71
End: 2021-12-13
Payer: MEDICARE

## 2021-12-13 ENCOUNTER — HOSPITAL ENCOUNTER (OUTPATIENT)
Dept: RADIOLOGY | Facility: HOSPITAL | Age: 71
Discharge: HOME | End: 2021-12-13
Attending: NURSE PRACTITIONER
Payer: MEDICARE

## 2021-12-13 ENCOUNTER — OFFICE VISIT (OUTPATIENT)
Dept: FAMILY MEDICINE | Facility: CLINIC | Age: 71
End: 2021-12-13
Payer: MEDICARE

## 2021-12-13 VITALS
TEMPERATURE: 98 F | BODY MASS INDEX: 39.07 KG/M2 | HEIGHT: 62 IN | WEIGHT: 212.31 LBS | HEART RATE: 88 BPM | OXYGEN SATURATION: 95 % | SYSTOLIC BLOOD PRESSURE: 169 MMHG | DIASTOLIC BLOOD PRESSURE: 90 MMHG

## 2021-12-13 DIAGNOSIS — R05.9 COUGH: ICD-10-CM

## 2021-12-13 DIAGNOSIS — J06.9 UPPER RESPIRATORY TRACT INFECTION, UNSPECIFIED TYPE: Primary | ICD-10-CM

## 2021-12-13 DIAGNOSIS — R06.02 SOB (SHORTNESS OF BREATH): ICD-10-CM

## 2021-12-13 LAB
CTP QC/QA: YES
CTP QC/QA: YES
FLUAV AG NPH QL: NEGATIVE
FLUBV AG NPH QL: NEGATIVE
SARS-COV-2 RDRP RESP QL NAA+PROBE: NEGATIVE

## 2021-12-13 PROCEDURE — 71046 XR CHEST PA AND LATERAL: ICD-10-PCS | Mod: 26,,, | Performed by: RADIOLOGY

## 2021-12-13 PROCEDURE — U0002: ICD-10-PCS | Mod: QW,S$GLB,, | Performed by: NURSE PRACTITIONER

## 2021-12-13 PROCEDURE — 99213 PR OFFICE/OUTPT VISIT, EST, LEVL III, 20-29 MIN: ICD-10-PCS | Mod: 25,S$GLB,, | Performed by: NURSE PRACTITIONER

## 2021-12-13 PROCEDURE — 71046 X-RAY EXAM CHEST 2 VIEWS: CPT | Mod: TC,PO

## 2021-12-13 PROCEDURE — 87804 INFLUENZA ASSAY W/OPTIC: CPT | Mod: QW,S$GLB,, | Performed by: NURSE PRACTITIONER

## 2021-12-13 PROCEDURE — 99213 OFFICE O/P EST LOW 20 MIN: CPT | Mod: 25,S$GLB,, | Performed by: NURSE PRACTITIONER

## 2021-12-13 PROCEDURE — U0002 COVID-19 LAB TEST NON-CDC: HCPCS | Mod: QW,S$GLB,, | Performed by: NURSE PRACTITIONER

## 2021-12-13 PROCEDURE — 99999 PR PBB SHADOW E&M-EST. PATIENT-LVL V: ICD-10-PCS | Mod: PBBFAC,,, | Performed by: NURSE PRACTITIONER

## 2021-12-13 PROCEDURE — 87804 POCT INFLUENZA A/B: ICD-10-PCS | Mod: QW,S$GLB,, | Performed by: NURSE PRACTITIONER

## 2021-12-13 PROCEDURE — 71046 X-RAY EXAM CHEST 2 VIEWS: CPT | Mod: 26,,, | Performed by: RADIOLOGY

## 2021-12-13 PROCEDURE — 99999 PR PBB SHADOW E&M-EST. PATIENT-LVL V: CPT | Mod: PBBFAC,,, | Performed by: NURSE PRACTITIONER

## 2021-12-13 RX ORDER — DOXYCYCLINE HYCLATE 100 MG
100 TABLET ORAL 2 TIMES DAILY
Qty: 20 TABLET | Refills: 0 | Status: SHIPPED | OUTPATIENT
Start: 2021-12-13 | End: 2021-12-23

## 2021-12-13 RX ORDER — PROMETHAZINE HYDROCHLORIDE AND DEXTROMETHORPHAN HYDROBROMIDE 6.25; 15 MG/5ML; MG/5ML
5 SYRUP ORAL 2 TIMES DAILY PRN
Qty: 118 ML | Refills: 0 | Status: SHIPPED | OUTPATIENT
Start: 2021-12-13 | End: 2021-12-23

## 2021-12-14 ENCOUNTER — TELEPHONE (OUTPATIENT)
Dept: FAMILY MEDICINE | Facility: CLINIC | Age: 71
End: 2021-12-14
Payer: MEDICARE

## 2021-12-14 DIAGNOSIS — M54.9 BACK PAIN, UNSPECIFIED BACK LOCATION, UNSPECIFIED BACK PAIN LATERALITY, UNSPECIFIED CHRONICITY: Primary | ICD-10-CM

## 2021-12-15 ENCOUNTER — TELEPHONE (OUTPATIENT)
Dept: PAIN MEDICINE | Facility: CLINIC | Age: 71
End: 2021-12-15
Payer: MEDICARE

## 2021-12-15 ENCOUNTER — PATIENT MESSAGE (OUTPATIENT)
Dept: PHYSICAL MEDICINE AND REHAB | Facility: CLINIC | Age: 71
End: 2021-12-15
Payer: MEDICARE

## 2021-12-16 ENCOUNTER — TELEPHONE (OUTPATIENT)
Dept: FAMILY MEDICINE | Facility: CLINIC | Age: 71
End: 2021-12-16
Payer: MEDICARE

## 2021-12-16 ENCOUNTER — TELEPHONE (OUTPATIENT)
Dept: PAIN MEDICINE | Facility: CLINIC | Age: 71
End: 2021-12-16
Payer: MEDICARE

## 2021-12-16 DIAGNOSIS — M54.9 BACK PAIN, UNSPECIFIED BACK LOCATION, UNSPECIFIED BACK PAIN LATERALITY, UNSPECIFIED CHRONICITY: Primary | ICD-10-CM

## 2021-12-16 RX ORDER — ETODOLAC 400 MG/1
400 TABLET, FILM COATED ORAL 2 TIMES DAILY
Qty: 60 TABLET | Refills: 0 | Status: SHIPPED | OUTPATIENT
Start: 2021-12-16 | End: 2022-10-20

## 2021-12-17 RX ORDER — GABAPENTIN 100 MG/1
100 CAPSULE ORAL 3 TIMES DAILY PRN
Qty: 90 CAPSULE | Refills: 0 | Status: SHIPPED | OUTPATIENT
Start: 2021-12-17 | End: 2022-02-04

## 2022-01-05 ENCOUNTER — LAB VISIT (OUTPATIENT)
Dept: LAB | Facility: HOSPITAL | Age: 72
End: 2022-01-05
Attending: FAMILY MEDICINE
Payer: MEDICARE

## 2022-01-05 ENCOUNTER — TELEPHONE (OUTPATIENT)
Dept: FAMILY MEDICINE | Facility: CLINIC | Age: 72
End: 2022-01-05
Payer: MEDICARE

## 2022-01-05 ENCOUNTER — OFFICE VISIT (OUTPATIENT)
Dept: FAMILY MEDICINE | Facility: CLINIC | Age: 72
End: 2022-01-05
Payer: MEDICARE

## 2022-01-05 VITALS
TEMPERATURE: 98 F | DIASTOLIC BLOOD PRESSURE: 79 MMHG | WEIGHT: 210.63 LBS | OXYGEN SATURATION: 97 % | RESPIRATION RATE: 16 BRPM | HEIGHT: 62 IN | HEART RATE: 76 BPM | SYSTOLIC BLOOD PRESSURE: 138 MMHG | BODY MASS INDEX: 38.76 KG/M2

## 2022-01-05 DIAGNOSIS — J45.40 MODERATE PERSISTENT ASTHMA, UNSPECIFIED WHETHER COMPLICATED: ICD-10-CM

## 2022-01-05 DIAGNOSIS — G89.29 CHRONIC PAIN OF RIGHT KNEE: ICD-10-CM

## 2022-01-05 DIAGNOSIS — M25.561 CHRONIC PAIN OF RIGHT KNEE: ICD-10-CM

## 2022-01-05 DIAGNOSIS — Z79.899 ENCOUNTER FOR LONG-TERM (CURRENT) USE OF MEDICATIONS: ICD-10-CM
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
## 2022-01-05 DIAGNOSIS — I10 ESSENTIAL HYPERTENSION: ICD-10-CM

## 2022-01-05 DIAGNOSIS — Z13.220 ENCOUNTER FOR LIPID SCREENING FOR CARDIOVASCULAR DISEASE: ICD-10-CM

## 2022-01-05 DIAGNOSIS — E66.2 CLASS 2 OBESITY WITH ALVEOLAR HYPOVENTILATION, SERIOUS COMORBIDITY, AND BODY MASS INDEX (BMI) OF 38.0 TO 38.9 IN ADULT: ICD-10-CM

## 2022-01-05 DIAGNOSIS — Z76.0 MEDICATION REFILL: ICD-10-CM

## 2022-01-05 DIAGNOSIS — Z76.89 REFERRAL OF PATIENT: ICD-10-CM

## 2022-01-05 DIAGNOSIS — Z00.00 WELL ADULT EXAM: Primary | ICD-10-CM

## 2022-01-05 DIAGNOSIS — G47.00 INSOMNIA, UNSPECIFIED TYPE: ICD-10-CM

## 2022-01-05 DIAGNOSIS — Z00.00 WELL ADULT EXAM: ICD-10-CM

## 2022-01-05 DIAGNOSIS — Z13.6 ENCOUNTER FOR LIPID SCREENING FOR CARDIOVASCULAR DISEASE: ICD-10-CM

## 2022-01-05 DIAGNOSIS — Z12.11 COLON CANCER SCREENING: Primary | ICD-10-CM

## 2022-01-05 DIAGNOSIS — Z12.31 ENCOUNTER FOR SCREENING MAMMOGRAM FOR BREAST CANCER: ICD-10-CM

## 2022-01-05 LAB
ALBUMIN SERPL BCP-MCNC: 3.9 G/DL (ref 3.5–5.2)
ALP SERPL-CCNC: 139 U/L (ref 55–135)
ALT SERPL W/O P-5'-P-CCNC: 18 U/L (ref 10–44)
ANION GAP SERPL CALC-SCNC: 13 MMOL/L (ref 8–16)
AST SERPL-CCNC: 32 U/L (ref 10–40)
BILIRUB SERPL-MCNC: 0.5 MG/DL (ref 0.1–1)
BUN SERPL-MCNC: 15 MG/DL (ref 8–23)
CALCIUM SERPL-MCNC: 9.5 MG/DL (ref 8.7–10.5)
CHLORIDE SERPL-SCNC: 104 MMOL/L (ref 95–110)
CHOLEST SERPL-MCNC: 183 MG/DL (ref 120–199)
CHOLEST/HDLC SERPL: 3.3 {RATIO} (ref 2–5)
CO2 SERPL-SCNC: 23 MMOL/L (ref 23–29)
CREAT SERPL-MCNC: 0.7 MG/DL (ref 0.5–1.4)
ERYTHROCYTE [DISTWIDTH] IN BLOOD BY AUTOMATED COUNT: 13.2 % (ref 11.5–14.5)
EST. GFR  (AFRICAN AMERICAN): >60 ML/MIN/1.73 M^2
EST. GFR  (NON AFRICAN AMERICAN): >60 ML/MIN/1.73 M^2
ESTIMATED AVG GLUCOSE: 105 MG/DL (ref 68–131)
GLUCOSE SERPL-MCNC: 102 MG/DL (ref 70–110)
HBA1C MFR BLD: 5.3 % (ref 4–5.6)
HCT VFR BLD AUTO: 43.4 % (ref 37–48.5)
HDLC SERPL-MCNC: 56 MG/DL (ref 40–75)
HDLC SERPL: 30.6 % (ref 20–50)
HGB BLD-MCNC: 13.9 G/DL (ref 12–16)
LDLC SERPL CALC-MCNC: 109.4 MG/DL (ref 63–159)
MCH RBC QN AUTO: 29.8 PG (ref 27–31)
MCHC RBC AUTO-ENTMCNC: 32 G/DL (ref 32–36)
MCV RBC AUTO: 93 FL (ref 82–98)
NONHDLC SERPL-MCNC: 127 MG/DL
PLATELET # BLD AUTO: 323 K/UL (ref 150–450)
PMV BLD AUTO: 10.5 FL (ref 9.2–12.9)
POTASSIUM SERPL-SCNC: 4.5 MMOL/L (ref 3.5–5.1)
PROT SERPL-MCNC: 7 G/DL (ref 6–8.4)
RBC # BLD AUTO: 4.67 M/UL (ref 4–5.4)
SODIUM SERPL-SCNC: 140 MMOL/L (ref 136–145)
TRIGL SERPL-MCNC: 88 MG/DL (ref 30–150)
TSH SERPL DL<=0.005 MIU/L-ACNC: 1.76 UIU/ML (ref 0.4–4)
WBC # BLD AUTO: 8.24 K/UL (ref 3.9–12.7)

## 2022-01-05 PROCEDURE — 1101F PT FALLS ASSESS-DOCD LE1/YR: CPT | Mod: CPTII,S$GLB,, | Performed by: FAMILY MEDICINE

## 2022-01-05 PROCEDURE — 1159F PR MEDICATION LIST DOCUMENTED IN MEDICAL RECORD: ICD-10-PCS | Mod: CPTII,S$GLB,, | Performed by: FAMILY MEDICINE

## 2022-01-05 PROCEDURE — 3008F BODY MASS INDEX DOCD: CPT | Mod: CPTII,S$GLB,, | Performed by: FAMILY MEDICINE

## 2022-01-05 PROCEDURE — 99999 PR PBB SHADOW E&M-EST. PATIENT-LVL V: ICD-10-PCS | Mod: PBBFAC,,, | Performed by: FAMILY MEDICINE

## 2022-01-05 PROCEDURE — 3075F PR MOST RECENT SYSTOLIC BLOOD PRESS GE 130-139MM HG: ICD-10-PCS | Mod: CPTII,S$GLB,, | Performed by: FAMILY MEDICINE

## 2022-01-05 PROCEDURE — 1101F PR PT FALLS ASSESS DOC 0-1 FALLS W/OUT INJ PAST YR: ICD-10-PCS | Mod: CPTII,S$GLB,, | Performed by: FAMILY MEDICINE

## 2022-01-05 PROCEDURE — 99214 OFFICE O/P EST MOD 30 MIN: CPT | Mod: S$GLB,,, | Performed by: FAMILY MEDICINE

## 2022-01-05 PROCEDURE — 3288F FALL RISK ASSESSMENT DOCD: CPT | Mod: CPTII,S$GLB,, | Performed by: FAMILY MEDICINE

## 2022-01-05 PROCEDURE — 1160F RVW MEDS BY RX/DR IN RCRD: CPT | Mod: CPTII,S$GLB,, | Performed by: FAMILY MEDICINE

## 2022-01-05 PROCEDURE — 80061 LIPID PANEL: CPT | Performed by: FAMILY MEDICINE

## 2022-01-05 PROCEDURE — 3075F SYST BP GE 130 - 139MM HG: CPT | Mod: CPTII,S$GLB,, | Performed by: FAMILY MEDICINE

## 2022-01-05 PROCEDURE — 99999 PR PBB SHADOW E&M-EST. PATIENT-LVL V: CPT | Mod: PBBFAC,,, | Performed by: FAMILY MEDICINE

## 2022-01-05 PROCEDURE — 3044F HG A1C LEVEL LT 7.0%: CPT | Mod: CPTII,S$GLB,, | Performed by: FAMILY MEDICINE

## 2022-01-05 PROCEDURE — 80053 COMPREHEN METABOLIC PANEL: CPT | Performed by: FAMILY MEDICINE

## 2022-01-05 PROCEDURE — 99214 PR OFFICE/OUTPT VISIT, EST, LEVL IV, 30-39 MIN: ICD-10-PCS | Mod: S$GLB,,, | Performed by: FAMILY MEDICINE

## 2022-01-05 PROCEDURE — 99499 RISK ADDL DX/OHS AUDIT: ICD-10-PCS | Mod: S$GLB,,, | Performed by: FAMILY MEDICINE

## 2022-01-05 PROCEDURE — 99499 UNLISTED E&M SERVICE: CPT | Mod: S$GLB,,, | Performed by: FAMILY MEDICINE

## 2022-01-05 PROCEDURE — 3288F PR FALLS RISK ASSESSMENT DOCUMENTED: ICD-10-PCS | Mod: CPTII,S$GLB,, | Performed by: FAMILY MEDICINE

## 2022-01-05 PROCEDURE — 83036 HEMOGLOBIN GLYCOSYLATED A1C: CPT | Performed by: FAMILY MEDICINE

## 2022-01-05 PROCEDURE — 36415 COLL VENOUS BLD VENIPUNCTURE: CPT | Mod: PO | Performed by: FAMILY MEDICINE

## 2022-01-05 PROCEDURE — 3078F PR MOST RECENT DIASTOLIC BLOOD PRESSURE < 80 MM HG: ICD-10-PCS | Mod: CPTII,S$GLB,, | Performed by: FAMILY MEDICINE

## 2022-01-05 PROCEDURE — 1159F MED LIST DOCD IN RCRD: CPT | Mod: CPTII,S$GLB,, | Performed by: FAMILY MEDICINE

## 2022-01-05 PROCEDURE — 1160F PR REVIEW ALL MEDS BY PRESCRIBER/CLIN PHARMACIST DOCUMENTED: ICD-10-PCS | Mod: CPTII,S$GLB,, | Performed by: FAMILY MEDICINE

## 2022-01-05 PROCEDURE — 84443 ASSAY THYROID STIM HORMONE: CPT | Performed by: FAMILY MEDICINE

## 2022-01-05 PROCEDURE — 3078F DIAST BP <80 MM HG: CPT | Mod: CPTII,S$GLB,, | Performed by: FAMILY MEDICINE

## 2022-01-05 PROCEDURE — 85027 COMPLETE CBC AUTOMATED: CPT | Performed by: FAMILY MEDICINE

## 2022-01-05 PROCEDURE — 1125F PR PAIN SEVERITY QUANTIFIED, PAIN PRESENT: ICD-10-PCS | Mod: CPTII,S$GLB,, | Performed by: FAMILY MEDICINE

## 2022-01-05 PROCEDURE — 3044F PR MOST RECENT HEMOGLOBIN A1C LEVEL <7.0%: ICD-10-PCS | Mod: CPTII,S$GLB,, | Performed by: FAMILY MEDICINE

## 2022-01-05 PROCEDURE — 1125F AMNT PAIN NOTED PAIN PRSNT: CPT | Mod: CPTII,S$GLB,, | Performed by: FAMILY MEDICINE

## 2022-01-05 PROCEDURE — 3008F PR BODY MASS INDEX (BMI) DOCUMENTED: ICD-10-PCS | Mod: CPTII,S$GLB,, | Performed by: FAMILY MEDICINE

## 2022-01-05 RX ORDER — BUTALBITAL, ACETAMINOPHEN AND CAFFEINE 50; 325; 40 MG/1; MG/1; MG/1
1 TABLET ORAL EVERY 4 HOURS PRN
Qty: 30 TABLET | Refills: 1 | Status: SHIPPED | OUTPATIENT
Start: 2022-01-05 | End: 2022-03-15

## 2022-01-05 RX ORDER — ALPRAZOLAM 0.5 MG/1
TABLET ORAL
Qty: 30 TABLET | Refills: 5 | Status: SHIPPED | OUTPATIENT
Start: 2022-01-05 | End: 2022-08-01 | Stop reason: SDUPTHER

## 2022-01-05 RX ORDER — ALBUTEROL SULFATE 90 UG/1
2 AEROSOL, METERED RESPIRATORY (INHALATION) EVERY 6 HOURS PRN
Qty: 18 G | Refills: 1 | Status: SHIPPED | OUTPATIENT
Start: 2022-01-05 | End: 2023-04-15 | Stop reason: SDUPTHER

## 2022-01-05 NOTE — TELEPHONE ENCOUNTER
Good Morning! Dr. Saez saw pt listed this morning and referred her to Gastro, I am unable to schedule with you guys to get her booked, if you don't mind calling the pt to get her set up with an available provider, I would greatly appreciate it. Thanks Much!

## 2022-01-05 NOTE — PROGRESS NOTES
This note is specifically for wellness visit performed today.   WELLNESS EXAM      Patient ID: Maria M Reeves is a 71 y.o. female with  has a past medical history of Arthritis, Asthma, moderate persistent, Depression, Encounter for long-term (current) use of medications (8/16/2019), Fibromyalgia, GERD (gastroesophageal reflux disease), HBP (high blood pressure), Hyperlipemia, IBS (irritable bowel syndrome), Migraines, and Sleep apnea.   Chief Complaint:  Encounter for wellness exam    Well Adult Physical: Patient here for a comprehensive physical exam.The patient reports Chronic problems.  Do you take any herbs or supplements that were not prescribed by a doctor? no   Are you taking calcium supplements? no    History:  None current.  Patient with history of hysterectomy.  LMP: No LMP recorded. Patient has had a hysterectomy.   MMG:  No relevant family history has been documented.   PAP: No result found not indicated  Colon cancer screening: Last Colonoscopy completed on 12/17/2012   Patient is due for colonoscopy.  She is requesting referral to Gastroenterology.    Health Maintenance Topics with due status: Not Due       Topic Last Completion Date    Colorectal Cancer Screening 12/17/2012    DEXA SCAN 11/11/2019    Lipid Panel 04/06/2021      ==============================================  History reviewed.   Health Maintenance Due   Topic Date Due    TETANUS VACCINE  Never done    Shingles Vaccine (1 of 2) Never done    Mammogram  05/20/2020    COVID-19 Vaccine (3 - Booster for Moderna series) 09/18/2021       Past Medical History:  Past Medical History:   Diagnosis Date    Arthritis     Asthma, moderate persistent     Depression     Encounter for long-term (current) use of medications 8/16/2019 August 16, 2019  Patient is on CHRONIC long-term drug therapy for managed conditions. See medication list. Reports compliance.  No side effects reported.  Routine lab work is being monitored.  Patient does   need refills today.   Lab Results Component Value Date  WBC 8.00 01/15/2013  HGB 13.6 01/15/2013  HCT 41.2 01/15/2013  MCV 86.2 01/15/2013   01/15/2013   CMP Sodium Date Value Ref Range     Fibromyalgia     GERD (gastroesophageal reflux disease)     HBP (high blood pressure)     Hyperlipemia     IBS (irritable bowel syndrome)     Migraines     Sleep apnea      Past Surgical History:   Procedure Laterality Date    bleeding ulcer      HYSTERECTOMY      JOINT REPLACEMENT      TONSILLECTOMY      TOTAL KNEE ARTHROPLASTY Left 2018    Dr. Duke     Review of patient's allergies indicates:   Allergen Reactions    Budesonide-formoterol Shortness Of Breath     Throat closes up     Duloxetine Shortness Of Breath and Swelling    Meloxicam Shortness Of Breath and Swelling    Simvastatin Shortness Of Breath and Swelling    Zoloft [sertraline] Anaphylaxis     Tongue swelling    Amoxicillin-pot clavulanate Diarrhea    Histamine h2 inhibitors Other (See Comments)     Constipation    Metoclopramide Nausea And Vomiting    Metoprolol Other (See Comments)     Cough    Phenothiazines Other (See Comments)     Not sure of reaction.     Current Outpatient Medications on File Prior to Visit   Medication Sig Dispense Refill    albuterol (PROVENTIL HFA) 90 mcg/actuation inhaler Inhale 2 puffs into the lungs every 6 (six) hours as needed. 1 Inhaler 3    albuterol sulfate 2.5 mg/0.5 mL Nebu Take 2.5 mg by nebulization every 6 (six) hours as needed.      calcium carbonate (OS-MARY) 500 mg calcium (1,250 mg) tablet Take 1 tablet (500 mg total) by mouth 3 (three) times daily. 90 tablet 11    carvediloL (COREG) 12.5 MG tablet TAKE ONE TABLET BY MOUTH TWICE DAILY WITH A MEAL 180 tablet 4    diclofenac sodium (VOLTAREN) 1 % Gel Apply small amount (2 grams) to painful joint area 4 times daily as needed 100 g 1    etodolac (LODINE) 400 MG tablet Take 1 tablet (400 mg total) by mouth 2 (two) times daily. 60 tablet 0     gabapentin (NEURONTIN) 100 MG capsule Take 1 capsule (100 mg total) by mouth 3 (three) times daily as needed (back pain). 90 capsule 0    melatonin 3 mg Tab Take 3 mg by mouth every evening.      pantoprazole (PROTONIX) 40 MG tablet TAKE ONE TABLET BY MOUTH EVERY DAY 90 tablet 4    pravastatin (PRAVACHOL) 20 MG tablet Take 1 tablet (20 mg total) by mouth once daily. 90 tablet 4    rizatriptan (MAXALT) 10 MG tablet Take 10 mg by mouth as needed for Migraine.      venlafaxine (EFFEXOR-XR) 75 MG 24 hr capsule Take 1 capsule (75 mg total) by mouth once daily. 90 capsule 4    [DISCONTINUED] ALPRAZolam (XANAX) 0.5 MG tablet TAKE ONE TABLET BY MOUTH EVERY NIGHT AT BEDTIME AS NEEDED FOR ANXIETY 30 tablet 5    [DISCONTINUED] butalbital-acetaminophen-caffeine -40 mg (FIORICET, ESGIC) -40 mg per tablet TAKE ONE TABLET BY MOUTH EVERY 4 HOURS AS NEEDED 30 tablet 1    [DISCONTINUED] cyclobenzaprine (FLEXERIL) 10 MG tablet TAKE ONE TABLET BY MOUTH TWICE DAILY (Patient not taking: No sig reported) 60 tablet 3    [DISCONTINUED] famotidine (PEPCID) 40 MG tablet Take 1 tablet (40 mg total) by mouth once daily. (Patient not taking: No sig reported) 30 tablet 0    [DISCONTINUED] levocetirizine (XYZAL) 5 MG tablet TAKE ONE TABLET BY MOUTH EVERY EVENING (Patient not taking: No sig reported) 30 tablet 11    [DISCONTINUED] traMADoL (ULTRAM) 50 mg tablet Take 1 tablet (50 mg total) by mouth every 6 (six) hours as needed for Pain. (Patient not taking: No sig reported) 28 tablet 0     No current facility-administered medications on file prior to visit.     Social History     Socioeconomic History    Marital status:    Tobacco Use    Smoking status: Never Smoker    Smokeless tobacco: Never Used   Substance and Sexual Activity    Alcohol use: No    Drug use: No    Sexual activity: Never     Family History   Problem Relation Age of Onset    COPD Maternal Uncle         colon       Review of Systems    Constitutional: Negative for chills, fatigue, fever and unexpected weight change.   HENT: Negative for ear pain and sore throat.    Eyes: Negative for redness and visual disturbance.   Respiratory: Positive for cough and wheezing. Negative for shortness of breath.    Cardiovascular: Negative for chest pain and palpitations.   Gastrointestinal: Negative for nausea and vomiting.   Genitourinary: Negative for difficulty urinating and hematuria.   Musculoskeletal: Positive for arthralgias. Negative for myalgias.   Skin: Negative for rash and wound.   Neurological: Negative for weakness and headaches.   Psychiatric/Behavioral: Positive for sleep disturbance. The patient is nervous/anxious.       Objective:    Nursing note and vitals reviewed.  Vitals:    01/05/22 0817   BP: 138/79   Pulse: 76   Resp: 16   Temp: 97.8 °F (36.6 °C)     Body mass index is 38.52 kg/m².   Physical Exam  Vitals and nursing note reviewed.   Constitutional:       General: She is not in acute distress.     Appearance: She is well-developed and well-nourished. She is obese. She is not ill-appearing, toxic-appearing, sickly-appearing or diaphoretic.   HENT:      Head: Normocephalic and atraumatic.      Right Ear: Hearing and external ear normal.      Left Ear: Hearing and external ear normal.   Eyes:      General: Lids are normal.      Extraocular Movements: EOM normal.      Conjunctiva/sclera: Conjunctivae normal.      Pupils: Pupils are equal, round, and reactive to light.   Cardiovascular:      Rate and Rhythm: Normal rate and regular rhythm.      Heart sounds: Normal heart sounds.   Pulmonary:      Effort: Pulmonary effort is normal. No respiratory distress.      Breath sounds: Wheezing present.   Abdominal:      General: Bowel sounds are normal.      Palpations: Abdomen is soft.   Musculoskeletal:         General: Tenderness (Right knee medial joint tenderness, crepitus noted.) and deformity (Left knee) present. Normal range of motion.       Cervical back: Normal range of motion and neck supple.   Skin:     General: Skin is warm and dry.      Capillary Refill: Capillary refill takes less than 2 seconds.      Coloration: Skin is not pale.   Neurological:      Mental Status: She is alert and oriented to person, place, and time. She is not disoriented.      Cranial Nerves: No cranial nerve deficit.   Psychiatric:         Attention and Perception: She is attentive.         Mood and Affect: Mood and affect normal. Mood is not anxious or depressed.         Speech: Speech is not rapid and pressured or slurred.         Behavior: Behavior normal. Behavior is not agitated, aggressive or hyperactive. Behavior is cooperative.         Thought Content: Thought content normal. Thought content is not paranoid or delusional. Thought content does not include homicidal or suicidal ideation. Thought content does not include homicidal or suicidal plan.         Cognition and Memory: Cognition and memory normal. Memory is not impaired.         Judgment: Judgment normal.       Office Visit on 12/13/2021   Component Date Value Ref Range Status    POC Rapid COVID 12/13/2021 Negative  Negative Final     Acceptable 12/13/2021 Yes   Final    Rapid Influenza A Ag 12/13/2021 Negative  Negative Final    Rapid Influenza B Ag 12/13/2021 Negative  Negative Final     Acceptable 12/13/2021 Yes   Final      Assessment / Plan:      1.  ANNUAL WELLNESS EXAM -patient here for annual wellness exam.  Labs ordered.  Health maintenance was reviewed and ordered.  Medications were reviewed and reconciled.   Anticipatory guidance: Don't smoke.  Healthy diet and regular exercise recommended. Vaccine recommendations discussed.  See orders.  Reviewed Anticipatory guidance, risk factor reduction interventions and counseling, Complete history , physical was completed today.  Complete and thorough medication reconciliation was performed.  Discussed risks and benefits of  medications.  Advised patient on orders and health maintenance.  We discussed old records and old labs if available.  Will request any records not available through epic.  Continue current medications listed on your summary sheet.    All questions were answered. Patient had no further concerns. Advised of Wellness plan. Follow up in 1 year for ANNUAL WELLNESS EXAM    Orders Placed This Encounter   Procedures    Mammo Digital Screening Bilat w/ [facility]     Standing Status:   Standing     Number of Occurrences:   99     Standing Expiration Date:   7/7/2041     Order Specific Question:   May the Radiologist modify the order per protocol to meet the clinical needs of the patient?     Answer:   Yes    Ambulatory referral/consult to Gastroenterology     Standing Status:   Future     Standing Expiration Date:   2/5/2023     Referral Priority:   Routine     Referral Type:   Consultation     Referral Reason:   Specialty Services Required     Requested Specialty:   Gastroenterology     Number of Visits Requested:   1    Ambulatory referral/consult to Cardiology     Standing Status:   Future     Standing Expiration Date:   2/5/2023     Referral Priority:   Routine     Referral Type:   Consultation     Referral Reason:   Specialty Services Required     Requested Specialty:   Cardiology     Number of Visits Requested:   1     Medications Ordered This Encounter   Medications    albuterol (PROVENTIL/VENTOLIN HFA) 90 mcg/actuation inhaler     Sig: Inhale 2 puffs into the lungs every 6 (six) hours as needed for Wheezing.     Dispense:  18 g     Refill:  1    ALPRAZolam (XANAX) 0.5 MG tablet     Sig: TAKE ONE TABLET BY MOUTH EVERY NIGHT AT BEDTIME AS NEEDED FOR ANXIETY     Dispense:  30 tablet     Refill:  5    butalbital-acetaminophen-caffeine -40 mg (FIORICET, ESGIC) -40 mg per tablet     Sig: Take 1 tablet by mouth every 4 (four) hours as needed.     Dispense:  30 tablet     Refill:  1      Future Appointments      Date Provider Specialty Appt Notes    1/11/2022 Virgil Musa MD Cardiology Essential hypertension     1/18/2022  Family Medicine bp follow up     1/18/2022  Radiology Breast cancer screening by mammogram     6/29/2022 Uriah Saez MD Family Medicine xanax refill          Uriah Saez MD

## 2022-01-05 NOTE — PROGRESS NOTES
PLAN:      Problem List Items Addressed This Visit     Essential hypertension (Chronic)     Continue current medication regimen.Counseled on importance of hypertension disease course, I recommend ongoing Education for DASH-diet and exercise.  Counseled on medication regimen importance of treating high blood pressure.  Please be advised of risk of untreated blood pressure as discussed.  Please advised of ER precautions were given for symptoms of hypertensive urgency and emergency.           Relevant Orders    Ambulatory referral/consult to Cardiology    Asthma, moderate persistent (Chronic)     Continue inhalers.  Follow-up with Pulmonary if no improvement.  Asthma action plan         Relevant Medications    albuterol (PROVENTIL/VENTOLIN HFA) 90 mcg/actuation inhaler    Encounter for long-term (current) use of medications (Chronic)     Complete history and physical was completed today.  Complete and thorough medication reconciliation was performed.  Discussed risks and benefits of medications.  Advised patient on orders and health maintenance.  We discussed old records and old labs if available.  Will request any records not available through epic.  Continue current medications listed on your summary sheet.           Relevant Medications    butalbital-acetaminophen-caffeine -40 mg (FIORICET, ESGIC) -40 mg per tablet    ALPRAZolam (XANAX) 0.5 MG tablet    Chronic pain of right knee (Chronic)     Follow-up with orthopedics.  Continue Voltaren gel.The patient was checked in the [facility]'s  Prescription Monitoring Program. There appears to be no incongruities with the patient's verbalized history.            Insomnia (Chronic)     Discussed insomnia condition course.  Advised of first-line medications for this condition.  Also discussed sleep hygiene.  Information was given below.  Good sleep habits (sometimes referred to as sleep hygiene) can help you get a good nights  sleep.    Some habits that can improve your sleep health:  -Be consistent. Go to bed at the same time each night and get up at the same time each morning, including on the weekends  -Make sure your bedroom is quiet, dark, relaxing, and at a comfortable temperature  -Remove electronic devices, such as TVs, computers, and smart phones, from the bedroom  -Avoid large meals, caffeine, and alcohol before bedtime  -Get some exercise. Being physically active during the day can help you fall asleep more easily at night.           Relevant Medications    ALPRAZolam (XANAX) 0.5 MG tablet    Class 2 obesity with alveolar hypoventilation, serious comorbidity, and body mass index (BMI) of 38.0 to 38.9 in adult (Chronic)     Continue focus on lifestyle modification with diet and exercise.           Medication refill     Discussed risks and benefits of medications.  Monitor for side effects.         Relevant Medications    ALPRAZolam (XANAX) 0.5 MG tablet    Fall on or from sidewalk curb     Fall precautions.  Follow-up with orthopedics concerning her knee pain.           Other Visit Diagnoses     Well adult exam    -  Primary    Encounter for lipid screening for cardiovascular disease        Encounter for screening mammogram for breast cancer        Relevant Orders    Mammo Digital Screening Bilat w/ Santiago    Referral of patient        Relevant Orders    Ambulatory referral/consult to Gastroenterology        Future Appointments     Date Provider Specialty Appt Notes    1/11/2022 Virgil Musa MD Cardiology Essential hypertension     1/18/2022  Family Medicine bp follow up     1/18/2022  Radiology Breast cancer screening by mammogram     6/29/2022 Uriah Saez MD Family Medicine xanax refill         Medication Management for assessment above:   Medication List with Changes/Refills   New Medications    ALBUTEROL (PROVENTIL/VENTOLIN HFA) 90 MCG/ACTUATION INHALER    Inhale 2 puffs into the lungs every 6 (six) hours as  needed for Wheezing.   Current Medications    ALBUTEROL (PROVENTIL HFA) 90 MCG/ACTUATION INHALER    Inhale 2 puffs into the lungs every 6 (six) hours as needed.    ALBUTEROL SULFATE 2.5 MG/0.5 ML NEBU    Take 2.5 mg by nebulization every 6 (six) hours as needed.    CALCIUM CARBONATE (OS-MARY) 500 MG CALCIUM (1,250 MG) TABLET    Take 1 tablet (500 mg total) by mouth 3 (three) times daily.    CARVEDILOL (COREG) 12.5 MG TABLET    TAKE ONE TABLET BY MOUTH TWICE DAILY WITH A MEAL    DICLOFENAC SODIUM (VOLTAREN) 1 % GEL    Apply small amount (2 grams) to painful joint area 4 times daily as needed    ETODOLAC (LODINE) 400 MG TABLET    Take 1 tablet (400 mg total) by mouth 2 (two) times daily.    GABAPENTIN (NEURONTIN) 100 MG CAPSULE    Take 1 capsule (100 mg total) by mouth 3 (three) times daily as needed (back pain).    MELATONIN 3 MG TAB    Take 3 mg by mouth every evening.    PANTOPRAZOLE (PROTONIX) 40 MG TABLET    TAKE ONE TABLET BY MOUTH EVERY DAY    PRAVASTATIN (PRAVACHOL) 20 MG TABLET    Take 1 tablet (20 mg total) by mouth once daily.    RIZATRIPTAN (MAXALT) 10 MG TABLET    Take 10 mg by mouth as needed for Migraine.    VENLAFAXINE (EFFEXOR-XR) 75 MG 24 HR CAPSULE    Take 1 capsule (75 mg total) by mouth once daily.   Changed and/or Refilled Medications    Modified Medication Previous Medication    ALPRAZOLAM (XANAX) 0.5 MG TABLET ALPRAZolam (XANAX) 0.5 MG tablet       TAKE ONE TABLET BY MOUTH EVERY NIGHT AT BEDTIME AS NEEDED FOR ANXIETY    TAKE ONE TABLET BY MOUTH EVERY NIGHT AT BEDTIME AS NEEDED FOR ANXIETY    BUTALBITAL-ACETAMINOPHEN-CAFFEINE -40 MG (FIORICET, ESGIC) -40 MG PER TABLET butalbital-acetaminophen-caffeine -40 mg (FIORICET, ESGIC) -40 mg per tablet       Take 1 tablet by mouth every 4 (four) hours as needed.    TAKE ONE TABLET BY MOUTH EVERY 4 HOURS AS NEEDED   Discontinued Medications    CYCLOBENZAPRINE (FLEXERIL) 10 MG TABLET    TAKE ONE TABLET BY MOUTH TWICE DAILY     FAMOTIDINE (PEPCID) 40 MG TABLET    Take 1 tablet (40 mg total) by mouth once daily.    LEVOCETIRIZINE (XYZAL) 5 MG TABLET    TAKE ONE TABLET BY MOUTH EVERY EVENING    TRAMADOL (ULTRAM) 50 MG TABLET    Take 1 tablet (50 mg total) by mouth every 6 (six) hours as needed for Pain.       Uriah Saez M.D.  ==========================================================================  Subjective:   Patient ID: Maria M Reeves is a 71 y.o. female.  has a past medical history of Arthritis, Asthma, moderate persistent, Depression, Encounter for long-term (current) use of medications (8/16/2019), Fibromyalgia, GERD (gastroesophageal reflux disease), HBP (high blood pressure), Hyperlipemia, IBS (irritable bowel syndrome), Migraines, and Sleep apnea.   Chief Complaint: Annual Exam      Problem List Items Addressed This Visit     Essential hypertension (Chronic)    Overview     CHRONIC. STABLE. BP Reviewed.  Compliant with BP medications. No SE reported.     Creatinine   Date Value Ref Range Status   01/05/2022 0.7 0.5 - 1.4 mg/dL Final   05/20/2019 0.8 mg/dL Final              Current Assessment & Plan     Continue current medication regimen.Counseled on importance of hypertension disease course, I recommend ongoing Education for DASH-diet and exercise.  Counseled on medication regimen importance of treating high blood pressure.  Please be advised of risk of untreated blood pressure as discussed.  Please advised of ER precautions were given for symptoms of hypertensive urgency and emergency.           Asthma, moderate persistent (Chronic)    Overview     Chronic.  Stable.  Patient reports compliance inhalers.  No side effects reported.  Symptoms are controlled.         Current Assessment & Plan     Continue inhalers.  Follow-up with Pulmonary if no improvement.  Asthma action plan         Encounter for long-term (current) use of medications (Chronic)    Overview     August 16, 2019  CHRONIC long-term drug therapy for managed  conditions. See medication list. Reports compliance.  No side effects reported.  Routine lab work is being monitored.  Patient does  need refills today. November 2019:  CHRONIC. Stable. Compliant with medications for managed conditions. See medication list. No SE reported. Routine lab analysis is being monitored. Refills were addressed.JUNE 2020:  CHRONIC. Stable. Compliant with medications for managed conditions. See medication list. No SE reported. Routine lab analysis is being monitored. Refills were addressed.  April 2021:  Reviewed labs.  January 2022:  Reviewed labs.  Lab Results   Component Value Date    WBC 8.24 01/05/2022    HGB 13.9 01/05/2022    HCT 43.4 01/05/2022    MCV 93 01/05/2022     01/05/2022       Chemistry        Component Value Date/Time     01/05/2022 0911     05/20/2019 0000    K 4.5 01/05/2022 0911    K 4.4 05/20/2019 0000     01/05/2022 0911     05/20/2019 0000    CO2 23 01/05/2022 0911    CO2 28 05/20/2019 0000    BUN 15 01/05/2022 0911    BUN 15 05/20/2019 0000    CREATININE 0.7 01/05/2022 0911    CREATININE 0.8 05/20/2019 0000     01/05/2022 0911        Component Value Date/Time    CALCIUM 9.5 01/05/2022 0911    CALCIUM 9.4 05/20/2019 0000    ALKPHOS 139 (H) 01/05/2022 0911    ALKPHOS 156 05/20/2019 0000    AST 32 01/05/2022 0911    ALT 18 01/05/2022 0911    ALT 21 05/20/2019 0000    BILITOT 0.5 01/05/2022 0911    ESTGFRAFRICA >60.0 01/05/2022 0911    EGFRNONAA >60.0 01/05/2022 0911        Lab Results   Component Value Date    TSH 1.756 01/05/2022     ======================================================         Current Assessment & Plan     Complete history and physical was completed today.  Complete and thorough medication reconciliation was performed.  Discussed risks and benefits of medications.  Advised patient on orders and health maintenance.  We discussed old records and old labs if available.  Will request any records not available through  epic.  Continue current medications listed on your summary sheet.           Chronic pain of right knee (Chronic)    Overview     January 2022:  Patient reports that her orthopedic appointment keeps getting canceled or push back because of COVID.  April 2021:  Patient still has not had the right knee replaced.  Patient is following up with Orthopedics soon.  Patient requesting refill on tramadol which does help with the pain.  No new injuries.JUNE 2020:  Chronic.  Uncontrolled.  Patient was scheduled to have right knee replacement by Dr. DUKE prior to COVID.  Patient was postpone and is still having significant pain.  Patient is only taking Tylenol over-the-counter for the pain.  Also using diclofenac gel.  With minimal relief.The patient was checked in the [facility] [employer] [facility]'s  Prescription Monitoring Program. There appears to be no incongruities with the patient's verbalized history. Initial HPI:  Onset:  Acute on chronicDuration:  Months to yearsLocation:  Right knee, patient is status post left knee replacement several years ago.  Patient has osteoarthritis in the right knee.  Previously performed by Dr. Uriah Duke.Treatments:  Over-the-counter pain relievers including Tylenol, Flexeril patient reports that she cannot take anti-inflammatories due to GI upset.  Previous episodes:  Yes patient has had steroid injections before but they never help, patient has been to physical therapy.  No trauma reported.           Current Assessment & Plan     Follow-up with orthopedics.  Continue Voltaren gel.The patient was checked in the [facility] [employer]'s  Prescription Monitoring Program. There appears to be no incongruities with the patient's verbalized history.            Insomnia (Chronic)    Overview     Chronic.  Controlled.  Stable.  Reports compliance with Xanax.  No abuse noted. The patient was checked in the [facility] [employer]'s  Prescription Monitoring Program. There  appears to be no incongruities with the patient's verbalized history.              Current Assessment & Plan     Discussed insomnia condition course.  Advised of first-line medications for this condition.  Also discussed sleep hygiene.  Information was given below.  Good sleep habits (sometimes referred to as sleep hygiene) can help you get a good nights sleep.    Some habits that can improve your sleep health:  -Be consistent. Go to bed at the same time each night and get up at the same time each morning, including on the weekends  -Make sure your bedroom is quiet, dark, relaxing, and at a comfortable temperature  -Remove electronic devices, such as TVs, computers, and smart phones, from the bedroom  -Avoid large meals, caffeine, and alcohol before bedtime  -Get some exercise. Being physically active during the day can help you fall asleep more easily at night.           Class 2 obesity with alveolar hypoventilation, serious comorbidity, and body mass index (BMI) of 38.0 to 38.9 in adult (Chronic)    Overview     BMI Readings from Last 10 Encounters:   01/05/22 38.52 kg/m²   12/13/21 38.83 kg/m²   04/06/21 41.41 kg/m²   06/15/20 41.01 kg/m²   11/11/19 39.36 kg/m²   08/16/19 40.24 kg/m²   01/15/13 42.43 kg/m²   01/15/13 42.44 kg/m²   01/15/13 42.51 kg/m²   12/13/12 40.24 kg/m²              Current Assessment & Plan     Continue focus on lifestyle modification with diet and exercise.           Medication refill    Overview     See long-term med use above.  Patient needs refills reports compliance.  No side effects reported.         Current Assessment & Plan     Discussed risks and benefits of medications.  Monitor for side effects.         Fall on or from sidewalk curb    Overview     New problem.  Acute.  Patient reports that she tripped on the sidewalk and fell.  She hit her knee and is in pain.  She did not hit her head and did not lose consciousness.         Current Assessment & Plan     Fall precautions.   Follow-up with orthopedics concerning her knee pain.           Other Visit Diagnoses     Well adult exam    -  Primary    Encounter for lipid screening for cardiovascular disease        Encounter for screening mammogram for breast cancer        Referral of patient               Review of patient's allergies indicates:   Allergen Reactions    Budesonide-formoterol Shortness Of Breath     Throat closes up     Duloxetine Shortness Of Breath and Swelling    Meloxicam Shortness Of Breath and Swelling    Simvastatin Shortness Of Breath and Swelling    Zoloft [sertraline] Anaphylaxis     Tongue swelling    Amoxicillin-pot clavulanate Diarrhea    Histamine h2 inhibitors Other (See Comments)     Constipation    Metoclopramide Nausea And Vomiting    Metoprolol Other (See Comments)     Cough    Phenothiazines Other (See Comments)     Not sure of reaction.     Current Outpatient Medications   Medication Instructions    albuterol (PROVENTIL HFA) 90 mcg/actuation inhaler 2 puffs, Inhalation, Every 6 hours PRN    albuterol (PROVENTIL/VENTOLIN HFA) 90 mcg/actuation inhaler 2 puffs, Inhalation, Every 6 hours PRN    albuterol sulfate 2.5 mg, Nebulization, Every 6 hours PRN,      ALPRAZolam (XANAX) 0.5 MG tablet TAKE ONE TABLET BY MOUTH EVERY NIGHT AT BEDTIME AS NEEDED FOR ANXIETY    butalbital-acetaminophen-caffeine -40 mg (FIORICET, ESGIC) -40 mg per tablet 1 tablet, Oral, Every 4 hours PRN    calcium carbonate (OS-MARY) 500 mg, Oral, 3 times daily    carvediloL (COREG) 12.5 MG tablet TAKE ONE TABLET BY MOUTH TWICE DAILY WITH A MEAL    diclofenac sodium (VOLTAREN) 1 % Gel Apply small amount (2 grams) to painful joint area 4 times daily as needed    etodolac (LODINE) 400 mg, Oral, 2 times daily    gabapentin (NEURONTIN) 100 mg, Oral, 3 times daily PRN    melatonin (MELATIN) 3 mg, Oral, Nightly,      pantoprazole (PROTONIX) 40 MG tablet TAKE ONE TABLET BY MOUTH EVERY DAY    pravastatin (PRAVACHOL) 20  "mg, Oral, Daily    rizatriptan (MAXALT) 10 mg, Oral, As needed (PRN)    venlafaxine (EFFEXOR-XR) 75 mg, Oral, Daily      I have reviewed the PMH, social history, FamilyHx, surgical history, allergies and medications documented / confirmed by the patient at the time of this visit.  Review of Systems   Constitutional: Positive for fatigue. Negative for chills, fever and unexpected weight change.   HENT: Negative for ear pain and sore throat.    Eyes: Negative for redness and visual disturbance.   Respiratory: Positive for cough and wheezing. Negative for shortness of breath.    Cardiovascular: Negative for chest pain and palpitations.   Gastrointestinal: Negative for nausea and vomiting.   Genitourinary: Negative for difficulty urinating and hematuria.   Musculoskeletal: Positive for arthralgias. Negative for myalgias.   Skin: Negative for rash and wound.   Neurological: Negative for weakness and headaches.   Psychiatric/Behavioral: Positive for sleep disturbance. The patient is nervous/anxious.      Objective:   /79   Pulse 76   Temp 97.8 °F (36.6 °C) (Temporal)   Resp 16   Ht 5' 2" (1.575 m)   Wt 95.5 kg (210 lb 9.6 oz)   SpO2 97%   BMI 38.52 kg/m²   Physical Exam  Vitals and nursing note reviewed.   Constitutional:       General: She is not in acute distress.     Appearance: She is well-developed.   HENT:      Head: Normocephalic and atraumatic.   Eyes:      Pupils: Pupils are equal, round, and reactive to light.   Cardiovascular:      Rate and Rhythm: Normal rate and regular rhythm.      Heart sounds: Normal heart sounds. No murmur heard.      Pulmonary:      Effort: Pulmonary effort is normal. No respiratory distress.      Breath sounds: Wheezing present.   Abdominal:      General: Bowel sounds are normal.      Palpations: Abdomen is soft.   Musculoskeletal:         General: Tenderness (Right knee medial joint tenderness, crepitus noted.) and deformity (Left knee) present. Normal range of motion. "      Cervical back: Normal range of motion and neck supple.   Skin:     General: Skin is warm and dry.      Capillary Refill: Capillary refill takes less than 2 seconds.   Neurological:      Mental Status: She is alert and oriented to person, place, and time.      Cranial Nerves: No cranial nerve deficit.   Psychiatric:         Behavior: Behavior normal.        Patient is wearing a mask which limits physical exam due to COVID restrictions.   Assessment:     1. Well adult exam    2. Encounter for long-term (current) use of medications    3. Encounter for lipid screening for cardiovascular disease    4. Medication refill    5. Insomnia, unspecified type    6. Encounter for screening mammogram for breast cancer    7. Referral of patient    8. Class 2 obesity with alveolar hypoventilation, serious comorbidity, and body mass index (BMI) of 38.0 to 38.9 in adult    9. Fall involving sidewalk curb, initial encounter    10. Chronic pain of right knee    11. Essential hypertension    12. Moderate persistent asthma, unspecified whether complicated      MDM:   Moderate complexity.  Moderate risk.  Total time: 31 minutes.  This includes total time spent on the encounter, which includes face to face time and non-face to face time preparing to see the patient (eg, review of previous medical records, tests), Obtaining and/or reviewing separately obtained history, documenting clinical information in the electronic or other health record, independently interpreting results (not separately reported)/communicating results to the patient/family/caregiver, and/or care coordination (not separately reported).    I have Reviewed and summarized old records.  I have performed thorough medication reconciliation today and discussed risk and benefits of medications.  I have reviewed labs and discussed with patient.  All questions were answered.    I have signed for the following orders AND/OR meds.  Orders Placed This Encounter   Procedures     Mammo Digital Screening Bilat w/ Santiago     Standing Status:   Standing     Number of Occurrences:   99     Standing Expiration Date:   7/7/2041     Order Specific Question:   May the Radiologist modify the order per protocol to meet the clinical needs of the patient?     Answer:   Yes    Ambulatory referral/consult to Gastroenterology     Standing Status:   Future     Standing Expiration Date:   2/5/2023     Referral Priority:   Routine     Referral Type:   Consultation     Referral Reason:   Specialty Services Required     Requested Specialty:   Gastroenterology     Number of Visits Requested:   1    Ambulatory referral/consult to Cardiology     Standing Status:   Future     Standing Expiration Date:   2/5/2023     Referral Priority:   Routine     Referral Type:   Consultation     Referral Reason:   Specialty Services Required     Requested Specialty:   Cardiology     Number of Visits Requested:   1     Medications Ordered This Encounter   Medications    albuterol (PROVENTIL/VENTOLIN HFA) 90 mcg/actuation inhaler     Sig: Inhale 2 puffs into the lungs every 6 (six) hours as needed for Wheezing.     Dispense:  18 g     Refill:  1    ALPRAZolam (XANAX) 0.5 MG tablet     Sig: TAKE ONE TABLET BY MOUTH EVERY NIGHT AT BEDTIME AS NEEDED FOR ANXIETY     Dispense:  30 tablet     Refill:  5    butalbital-acetaminophen-caffeine -40 mg (FIORICET, ESGIC) -40 mg per tablet     Sig: Take 1 tablet by mouth every 4 (four) hours as needed.     Dispense:  30 tablet     Refill:  1        Follow up in about 6 months (around 7/5/2022), or if symptoms worsen or fail to improve, for Insomnia Med Refill, with Melissa Hartman NP, 2 weeks for BP check.    If no improvement in symptoms or symptoms worsen, advised to call/follow-up at clinic or go to ER. Patient voiced understanding and all questions/concerns were addressed.   DISCLAIMER: This note was compiled by using a speech recognition dictation system and therefore please  be aware that typographical / speech recognition errors can and do occur.  Please contact me if you see any errors specifically.    Uriah Saez M.D.       Office: [phone] [address] [place] [address]  FAX: [phone]

## 2022-01-05 NOTE — PATIENT INSTRUCTIONS
Follow up in about 6 months (around 7/5/2022), or if symptoms worsen or fail to improve, for Insomnia Med Refill, with Melissa Hartman NP.     Dear patient,   As a result of recent federal legislation (The Federal Cures Act), you may receive lab or pathology results from your visit in your [facility] account before your physician is able to contact you. Your physician or their representative will relay the results to you with their recommendations at their soonest availability.     If no improvement in symptoms or symptoms worsen, please be advised to call MD, follow-up at clinic and/or go to ER if becomes severe.    Uriah Saez M.D.        We Offer TELEHEALTH & Same Day Appointments!   Book your Telehealth appointment with me through my nurse or   Clinic appointments on [email or web address]!    [address] [place] [address]    Office: [phone]   FAX: [phone]    Check out my Linguee Page and Follow Me at: [email or web address]    Check out my website at [employer] by clicking on: [email or web address]    To Schedule appointments online, go to [email or web address]: [email or web address]

## 2022-01-06 NOTE — ASSESSMENT & PLAN NOTE
Continue current medication regimen.Counseled on importance of hypertension disease course, I recommend ongoing Education for DASH-diet and exercise.  Counseled on medication regimen importance of treating high blood pressure.  Please be advised of risk of untreated blood pressure as discussed.  Please advised of ER precautions were given for symptoms of hypertensive urgency and emergency.

## 2022-01-06 NOTE — ASSESSMENT & PLAN NOTE
Discussed insomnia condition course.  Advised of first-line medications for this condition.  Also discussed sleep hygiene.  Information was given below.  Good sleep habits (sometimes referred to as sleep hygiene) can help you get a good nights sleep.    Some habits that can improve your sleep health:  -Be consistent. Go to bed at the same time each night and get up at the same time each morning, including on the weekends  -Make sure your bedroom is quiet, dark, relaxing, and at a comfortable temperature  -Remove electronic devices, such as TVs, computers, and smart phones, from the bedroom  -Avoid large meals, caffeine, and alcohol before bedtime  -Get some exercise. Being physically active during the day can help you fall asleep more easily at night.

## 2022-01-06 NOTE — TELEPHONE ENCOUNTER
I have signed for the following orders AND/OR meds.  Please call the patient and ask the patient to schedule the testing AND/OR inform about any medications that were sent.      Orders Placed This Encounter   Procedures    Colonoscopy     Order Specific Question:   What procedure is being ordered?     Answer:   Colonoscopy

## 2022-01-06 NOTE — ASSESSMENT & PLAN NOTE
Follow-up with orthopedics.  Continue Voltaren gel.The patient was checked in the [facility] Board of Pharmacy's  Prescription Monitoring Program. There appears to be no incongruities with the patient's verbalized history.

## 2022-01-09 NOTE — PROGRESS NOTES
Make follow-up lab appointment per recommendation below.  Check to see if patient has seen the results through my chart.  If not then,  #CALL THE PATIENT# to discuss results/see if they have questions and document verification of contact. Make F/U appt if needed. [phone]    #My interpretation that was sent to them through [employer]:  Maria M, I have reviewed your recent blood work.     Your complete blood count is normal, no anemia.    Your metabolic panel which shows your glucose, kidney function, electrolytes, and liver function is stable.   Thyroid study is normal.   Your cholesterol is stable.    Your hemoglobin A1c is normal.  This test is gold standard screening test for diabetes.  It is a measures 3 months of your average blood sugar.    Repeat annual wellness labs in one year.    =========================  Also please address any outstanding health maintenance that may be due: TETANUS VACCINE Never done  Shingles Vaccine(1 of 2) Never done  Mammogram due on 05/20/2020  COVID-19 Vaccine(3 - Booster for Moderna series) due on 09/18/2021

## 2022-01-14 ENCOUNTER — PATIENT MESSAGE (OUTPATIENT)
Dept: GASTROENTEROLOGY | Facility: CLINIC | Age: 72
End: 2022-01-14
Payer: MEDICARE

## 2022-02-04 DIAGNOSIS — M54.9 BACK PAIN, UNSPECIFIED BACK LOCATION, UNSPECIFIED BACK PAIN LATERALITY, UNSPECIFIED CHRONICITY: ICD-10-CM

## 2022-02-04 RX ORDER — GABAPENTIN 100 MG/1
CAPSULE ORAL
Qty: 90 CAPSULE | Refills: 4 | Status: SHIPPED | OUTPATIENT
Start: 2022-02-04 | End: 2022-08-01 | Stop reason: SDUPTHER

## 2022-02-04 NOTE — TELEPHONE ENCOUNTER
No new care gaps identified.  Powered by [employer]. Reference number: [id number].   2/04/2022 9:05:41 AM CST

## 2022-02-17 DIAGNOSIS — Z79.899 ENCOUNTER FOR LONG-TERM (CURRENT) USE OF MEDICATIONS: ICD-10-CM

## 2022-02-17 DIAGNOSIS — Z76.0 MEDICATION REFILL: ICD-10-CM

## 2022-02-17 RX ORDER — PRAVASTATIN SODIUM 20 MG/1
20 TABLET ORAL DAILY
Qty: 90 TABLET | Refills: 3 | Status: SHIPPED | OUTPATIENT
Start: 2022-02-17 | End: 2022-08-01 | Stop reason: SDUPTHER

## 2022-02-17 NOTE — TELEPHONE ENCOUNTER
Refill Routing Note   Medication(s) are not appropriate for processing by [facility] for the following reason(s):      - Allergy/Contraindication (PRAVASTATIN)    [facility] action(s):  Defer          --->Care Gap information included in message below if applicable.   Medication reconciliation completed: No   Automatic Epic Generated Protocol Data:        Requested Prescriptions   Pending Prescriptions Disp Refills    pravastatin (PRAVACHOL) 20 MG tablet [Pharmacy Med Name: pravastatin 20 mg tablet] 90 tablet 3     Sig: Take 1 tablet (20 mg total) by mouth once daily.       Cardiovascular:  Antilipid - Statins Passed - 2/17/2022  2:55 PM        Passed - Patient is at least 18 years old        Passed - Valid encounter within last 15 months     Recent Visits  Date Type Provider Dept   01/05/22 Office Visit Uriah Saez MD [facility]   04/06/21 Office Visit Uriah Saez MD [facility]   06/15/20 Office Visit Uriah Saez MD [facility]   Showing recent visits within past 720 days and meeting all other requirements  Future Appointments  No visits were found meeting these conditions.  Showing future appointments within next 150 days and meeting all other requirements      Future Appointments              In 4 months Uriah Saez MD [facility] [place]                Passed - ALT is 131 or below and within 360 days     ALT   Date Value Ref Range Status   01/05/2022 18 10 - 44 U/L Final   04/06/2021 22 10 - 44 U/L Final   06/15/2020 51 (H) 10 - 44 U/L Final   05/20/2019 21  Final              Passed - AST is 119 or below and within 360 days     AST   Date Value Ref Range Status   01/05/2022 32 10 - 40 U/L Final     Comment:     *Result may be interfered by visible hemolysis   04/06/2021 30 10 - 40 U/L Final   06/15/2020 42 (H) 10 - 40 U/L Final              Passed - Total Cholesterol within 360 days     Lab Results   Component Value Date    CHOL 183  01/05/2022    CHOL 181 04/06/2021    CHOL 193 11/11/2019    CHLPL 202 05/20/2019              Passed - LDL within 360 days     LDL Calculated   Date Value Ref Range Status   05/20/2019 131 0 - 160 MG/DL Final     LDL Cholesterol   Date Value Ref Range Status   01/05/2022 109.4 63.0 - 159.0 mg/dL Final     Comment:     The National Cholesterol Education Program (NCEP) has set the  following guidelines (reference values) for LDL Cholesterol:  Optimal.......................<130 mg/dL  Borderline High...............130-159 mg/dL  High..........................160-189 mg/dL  Very High.....................>190 mg/dL              Passed - HDL within 360 days     HDL   Date Value Ref Range Status   01/05/2022 56 40 - 75 mg/dL Final     Comment:     The National Cholesterol Education Program (NCEP) has set the  following guidelines (reference values) for HDL Cholesterol:  Low...............<40 mg/dL  Optimal...........>60 mg/dL     05/20/2019 53 mg/dL Final            Passed - Triglycerides within 360 days     Lab Results   Component Value Date    TRIG 88 01/05/2022    TRIG 68 04/06/2021    TRIG 155 (H) 11/11/2019                    Appointments  past 12m or future 3m with PCP    Date Provider   Last Visit   1/5/2022 Uriah Saez MD   Next Visit   6/29/2022 Uriah Saez MD   ED visits in past 90 days: 0        Note composed:3:04 PM 02/17/2022

## 2022-02-17 NOTE — TELEPHONE ENCOUNTER
No new care gaps identified.  Powered by [employer]. Reference number: [id number].   2/17/2022 10:24:46 AM CST

## 2022-03-15 DIAGNOSIS — Z76.0 MEDICATION REFILL: ICD-10-CM

## 2022-03-15 DIAGNOSIS — Z79.899 ENCOUNTER FOR LONG-TERM (CURRENT) USE OF MEDICATIONS: ICD-10-CM

## 2022-03-15 RX ORDER — BUTALBITAL, ACETAMINOPHEN AND CAFFEINE 50; 325; 40 MG/1; MG/1; MG/1
TABLET ORAL
Qty: 30 TABLET | Refills: 4 | Status: SHIPPED | OUTPATIENT
Start: 2022-03-15 | End: 2022-08-21

## 2022-03-15 RX ORDER — VENLAFAXINE HYDROCHLORIDE 75 MG/1
CAPSULE, EXTENDED RELEASE ORAL
Qty: 90 CAPSULE | Refills: 4 | Status: SHIPPED | OUTPATIENT
Start: 2022-03-15 | End: 2022-08-21

## 2022-03-15 NOTE — TELEPHONE ENCOUNTER
No new care gaps identified.  Powered by [employer]. Reference number: [id number].   3/15/2022 1:00:50 PM CDT

## 2022-04-07 ENCOUNTER — PES CALL (OUTPATIENT)
Dept: ADMINISTRATIVE | Facility: CLINIC | Age: 72
End: 2022-04-07
Payer: MEDICARE

## 2022-04-26 ENCOUNTER — PATIENT MESSAGE (OUTPATIENT)
Dept: ADMINISTRATIVE | Facility: HOSPITAL | Age: 72
End: 2022-04-26
Payer: MEDICARE

## 2022-05-04 ENCOUNTER — PES CALL (OUTPATIENT)
Dept: ADMINISTRATIVE | Facility: CLINIC | Age: 72
End: 2022-05-04
Payer: MEDICARE

## 2022-05-24 ENCOUNTER — TELEPHONE (OUTPATIENT)
Dept: ORTHOPEDICS | Facility: CLINIC | Age: 72
End: 2022-05-24
Payer: MEDICARE

## 2022-05-24 ENCOUNTER — PATIENT OUTREACH (OUTPATIENT)
Dept: ADMINISTRATIVE | Facility: OTHER | Age: 72
End: 2022-05-24
Payer: MEDICARE

## 2022-05-24 NOTE — PROGRESS NOTES
Health Maintenance Due   Topic Date Due    TETANUS VACCINE  Never done    Shingles Vaccine (1 of 2) Never done    Mammogram  05/20/2020    COVID-19 Vaccine (4 - Booster for Moderna series) 03/02/2022     Updates were requested from care everywhere.  DIS/Chart was reviewed for overdue Proactive [facility] Encounters (ANGELA) topics (CRS, Breast Cancer Screening, Eye exam)  Health Maintenance has been updated.  LINKS immunization registry triggered.  Immunizations were reconciled.  Portal message sent to patient with scheduling link for mammogram 4/26/22

## 2022-05-24 NOTE — TELEPHONE ENCOUNTER
Tried to call patient to move appointment to Dr. Vora schedule due to the fact that Mary is not the surgeon patient didn't answer and voicemail is not available.

## 2022-05-26 ENCOUNTER — TELEPHONE (OUTPATIENT)
Dept: ORTHOPEDICS | Facility: CLINIC | Age: 72
End: 2022-05-26
Payer: MEDICARE

## 2022-05-30 DIAGNOSIS — M25.561 RIGHT KNEE PAIN, UNSPECIFIED CHRONICITY: Primary | ICD-10-CM

## 2022-05-30 DIAGNOSIS — G89.29 CHRONIC PAIN OF RIGHT KNEE: ICD-10-CM

## 2022-05-30 DIAGNOSIS — M25.561 CHRONIC PAIN OF RIGHT KNEE: ICD-10-CM

## 2022-05-31 ENCOUNTER — PATIENT MESSAGE (OUTPATIENT)
Dept: FAMILY MEDICINE | Facility: CLINIC | Age: 72
End: 2022-05-31
Payer: MEDICARE

## 2022-05-31 ENCOUNTER — OFFICE VISIT (OUTPATIENT)
Dept: ORTHOPEDICS | Facility: CLINIC | Age: 72
End: 2022-05-31
Payer: MEDICARE

## 2022-05-31 ENCOUNTER — HOSPITAL ENCOUNTER (OUTPATIENT)
Dept: RADIOLOGY | Facility: HOSPITAL | Age: 72
Discharge: HOME | End: 2022-05-31
Attending: ORTHOPAEDIC SURGERY
Payer: MEDICARE

## 2022-05-31 VITALS — HEIGHT: 62 IN | WEIGHT: 210 LBS | BODY MASS INDEX: 38.64 KG/M2

## 2022-05-31 DIAGNOSIS — G89.29 CHRONIC PAIN OF RIGHT KNEE: ICD-10-CM

## 2022-05-31 DIAGNOSIS — M25.561 CHRONIC PAIN OF RIGHT KNEE: ICD-10-CM

## 2022-05-31 DIAGNOSIS — M17.11 PRIMARY OSTEOARTHRITIS OF RIGHT KNEE: ICD-10-CM

## 2022-05-31 DIAGNOSIS — Z01.818 PRE-OP TESTING: Primary | ICD-10-CM

## 2022-05-31 PROCEDURE — 99999 PR PBB SHADOW E&M-EST. PATIENT-LVL IV: CPT | Mod: PBBFAC,,, | Performed by: ORTHOPAEDIC SURGERY

## 2022-05-31 PROCEDURE — 1160F RVW MEDS BY RX/DR IN RCRD: CPT | Mod: CPTII,S$GLB,, | Performed by: ORTHOPAEDIC SURGERY

## 2022-05-31 PROCEDURE — 1159F MED LIST DOCD IN RCRD: CPT | Mod: CPTII,S$GLB,, | Performed by: ORTHOPAEDIC SURGERY

## 2022-05-31 PROCEDURE — 1160F PR REVIEW ALL MEDS BY PRESCRIBER/CLIN PHARMACIST DOCUMENTED: ICD-10-PCS | Mod: CPTII,S$GLB,, | Performed by: ORTHOPAEDIC SURGERY

## 2022-05-31 PROCEDURE — 99204 OFFICE O/P NEW MOD 45 MIN: CPT | Mod: S$GLB,,, | Performed by: ORTHOPAEDIC SURGERY

## 2022-05-31 PROCEDURE — 73562 X-RAY EXAM OF KNEE 3: CPT | Mod: TC,PO,RT

## 2022-05-31 PROCEDURE — 99999 PR PBB SHADOW E&M-EST. PATIENT-LVL IV: ICD-10-PCS | Mod: PBBFAC,,, | Performed by: ORTHOPAEDIC SURGERY

## 2022-05-31 PROCEDURE — 1125F AMNT PAIN NOTED PAIN PRSNT: CPT | Mod: CPTII,S$GLB,, | Performed by: ORTHOPAEDIC SURGERY

## 2022-05-31 PROCEDURE — 3288F FALL RISK ASSESSMENT DOCD: CPT | Mod: CPTII,S$GLB,, | Performed by: ORTHOPAEDIC SURGERY

## 2022-05-31 PROCEDURE — 1101F PR PT FALLS ASSESS DOC 0-1 FALLS W/OUT INJ PAST YR: ICD-10-PCS | Mod: CPTII,S$GLB,, | Performed by: ORTHOPAEDIC SURGERY

## 2022-05-31 PROCEDURE — 3044F PR MOST RECENT HEMOGLOBIN A1C LEVEL <7.0%: ICD-10-PCS | Mod: CPTII,S$GLB,, | Performed by: ORTHOPAEDIC SURGERY

## 2022-05-31 PROCEDURE — 3288F PR FALLS RISK ASSESSMENT DOCUMENTED: ICD-10-PCS | Mod: CPTII,S$GLB,, | Performed by: ORTHOPAEDIC SURGERY

## 2022-05-31 PROCEDURE — 1101F PT FALLS ASSESS-DOCD LE1/YR: CPT | Mod: CPTII,S$GLB,, | Performed by: ORTHOPAEDIC SURGERY

## 2022-05-31 PROCEDURE — 3008F BODY MASS INDEX DOCD: CPT | Mod: CPTII,S$GLB,, | Performed by: ORTHOPAEDIC SURGERY

## 2022-05-31 PROCEDURE — 73562 XR KNEE 3 VIEW RIGHT: ICD-10-PCS | Mod: 26,RT,, | Performed by: RADIOLOGY

## 2022-05-31 PROCEDURE — 3008F PR BODY MASS INDEX (BMI) DOCUMENTED: ICD-10-PCS | Mod: CPTII,S$GLB,, | Performed by: ORTHOPAEDIC SURGERY

## 2022-05-31 PROCEDURE — 1159F PR MEDICATION LIST DOCUMENTED IN MEDICAL RECORD: ICD-10-PCS | Mod: CPTII,S$GLB,, | Performed by: ORTHOPAEDIC SURGERY

## 2022-05-31 PROCEDURE — 3044F HG A1C LEVEL LT 7.0%: CPT | Mod: CPTII,S$GLB,, | Performed by: ORTHOPAEDIC SURGERY

## 2022-05-31 PROCEDURE — 73562 X-RAY EXAM OF KNEE 3: CPT | Mod: 26,RT,, | Performed by: RADIOLOGY

## 2022-05-31 PROCEDURE — 1125F PR PAIN SEVERITY QUANTIFIED, PAIN PRESENT: ICD-10-PCS | Mod: CPTII,S$GLB,, | Performed by: ORTHOPAEDIC SURGERY

## 2022-05-31 PROCEDURE — 99204 PR OFFICE/OUTPT VISIT, NEW, LEVL IV, 45-59 MIN: ICD-10-PCS | Mod: S$GLB,,, | Performed by: ORTHOPAEDIC SURGERY

## 2022-05-31 RX ORDER — SODIUM CHLORIDE 0.9 % (FLUSH) 0.9 %
10 SYRINGE (ML) INJECTION EVERY 6 HOURS PRN
Status: SHIPPED | OUTPATIENT
Start: 2022-05-31

## 2022-05-31 NOTE — H&P
Chief Complaint   Patient presents with    Right Knee - Pain, Swelling         HPI:   This is a 71 y.o. who presents to clinic today complaining of right knee pain for 5 years after no known trauma. Pain is progressively worsening. No numbness or tingling. No associated signs or symptoms. She has failed NSAIDs, PT, and injections.    Past Medical History:   Diagnosis Date    Arthritis     Asthma, moderate persistent     Depression     Encounter for long-term (current) use of medications 8/16/2019 August 16, 2019  Patient is on CHRONIC long-term drug therapy for managed conditions. See medication list. Reports compliance.  No side effects reported.  Routine lab work is being monitored.  Patient does  need refills today.   Lab Results Component Value Date  WBC 8.00 01/15/2013  HGB 13.6 01/15/2013  HCT 41.2 01/15/2013  MCV 86.2 01/15/2013   01/15/2013   CMP Sodium Date Value Ref Range     Fibromyalgia     GERD (gastroesophageal reflux disease)     HBP (high blood pressure)     Hyperlipemia     IBS (irritable bowel syndrome)     Migraines     Sleep apnea      Past Surgical History:   Procedure Laterality Date    bleeding ulcer      HYSTERECTOMY      JOINT REPLACEMENT      TONSILLECTOMY      TOTAL KNEE ARTHROPLASTY Left 2018    Dr. Duke     Current Outpatient Medications on File Prior to Visit   Medication Sig Dispense Refill    albuterol (PROVENTIL HFA) 90 mcg/actuation inhaler Inhale 2 puffs into the lungs every 6 (six) hours as needed. 1 Inhaler 3    albuterol (PROVENTIL/VENTOLIN HFA) 90 mcg/actuation inhaler Inhale 2 puffs into the lungs every 6 (six) hours as needed for Wheezing. 18 g 1    albuterol sulfate 2.5 mg/0.5 mL Nebu Take 2.5 mg by nebulization every 6 (six) hours as needed.      ALPRAZolam (XANAX) 0.5 MG tablet TAKE ONE TABLET BY MOUTH EVERY NIGHT AT BEDTIME AS NEEDED FOR ANXIETY 30 tablet 5    butalbital-acetaminophen-caffeine -40 mg (FIORICET, ESGIC) -40 mg  per tablet TAKE ONE TABLET BY MOUTH EVERY 4 HOURS AS NEEDED 30 tablet 4    carvediloL (COREG) 12.5 MG tablet TAKE ONE TABLET BY MOUTH TWICE DAILY WITH A MEAL 180 tablet 4    diclofenac sodium (VOLTAREN) 1 % Gel Apply small amount (2 grams) to painful joint area 4 times daily as needed 100 g 1    etodolac (LODINE) 400 MG tablet Take 1 tablet (400 mg total) by mouth 2 (two) times daily. 60 tablet 0    gabapentin (NEURONTIN) 100 MG capsule TAKE ONE CAPSULE BY MOUTH THREE TIMES DAILY AS NEEDED FOR BACK PAIN 90 capsule 4    melatonin 3 mg Tab Take 3 mg by mouth every evening.      pantoprazole (PROTONIX) 40 MG tablet TAKE ONE TABLET BY MOUTH EVERY DAY 90 tablet 4    pravastatin (PRAVACHOL) 20 MG tablet Take 1 tablet (20 mg total) by mouth once daily. 90 tablet 3    rizatriptan (MAXALT) 10 MG tablet Take 10 mg by mouth as needed for Migraine.      venlafaxine (EFFEXOR-XR) 75 MG 24 hr capsule TAKE ONE CAPSULE BY MOUTH EVERY DAY 90 capsule 4    calcium carbonate (OS-MARY) 500 mg calcium (1,250 mg) tablet Take 1 tablet (500 mg total) by mouth 3 (three) times daily. 90 tablet 11     No current facility-administered medications on file prior to visit.     Review of patient's allergies indicates:   Allergen Reactions    Budesonide-formoterol Shortness Of Breath     Throat closes up     Duloxetine Shortness Of Breath and Swelling    Meloxicam Shortness Of Breath and Swelling    Simvastatin Shortness Of Breath and Swelling    Zoloft [sertraline] Anaphylaxis     Tongue swelling    Amoxicillin-pot clavulanate Diarrhea    Histamine h2 inhibitors Other (See Comments)     Constipation    Metoclopramide Nausea And Vomiting    Metoprolol Other (See Comments)     Cough    Phenothiazines Other (See Comments)     Not sure of reaction.     Family History   Problem Relation Age of Onset    COPD Maternal Uncle         colon     Social History     Socioeconomic History    Marital status:    Tobacco Use    Smoking  status: Never Smoker    Smokeless tobacco: Never Used   Substance and Sexual Activity    Alcohol use: No    Drug use: No    Sexual activity: Never       Review of Systems:  Constitutional:  Denies fever or chills   Eyes:  Denies change in visual acuity   HENT:  Denies nasal congestion or sore throat   Respiratory:  Denies cough or shortness of breath   Cardiovascular:  Denies chest pain or edema   GI:  Denies abdominal pain, nausea, vomiting, bloody stools or diarrhea   :  Denies dysuria   Integument:  Denies rash   Neurologic:  Denies headache, focal weakness or sensory changes   Endocrine:  Denies polyuria or polydipsia   Lymphatic:  Denies swollen glands   Psychiatric:  Denies depression or anxiety     Physical Exam:   Constitutional:  Well developed, well nourished, no acute distress, non-toxic appearance   Integument:  Well hydrated, no rash   Lymphatic:  No lymphadenopathy noted   Neurologic:  Alert & oriented x 3, CN 2-12 normal, normal motor function, normal sensory function, no focal deficits noted   Psychiatric:  Speech and behavior appropriate   Eyes: EOMI  Gi: abdomen soft    Bilateral Knee Exam    right Knee Exam     Tenderness   The patient is experiencing tenderness in the medial joint line.    Range of Motion   Extension: abnormal   Flexion: abnormal     Muscle Strength     The patient has normal knee strength.    Tests   Wong:  Medial - positive   Lachman:  Anterior - negative      Varus: negative  Valgus: negative  Patellar Apprehension: negative    Other   Erythema: absent  Sensation: normal  Pulse: present  Swelling: mild      left Knee Exam   left knee exam performed same as contralateral side and is normal.            X-rays were performed, personally reviewed by me and findings discussed with the patient.  3 views of the right knee show tricompartmental degenerative change most pronounced in the medial compartment with Kellgren 3 changes    Pre-op testing  -     CBC auto  differential; Future; Expected date: 05/31/2022  -     Comprehensive metabolic panel; Future; Expected date: 05/31/2022  -     CT Knee Without Contrast Right; Future; Expected date: 05/31/2022    Primary osteoarthritis of right knee  -     Vital signs; Standing  -     Diet NPO; Standing  -     Place SHANA hose; Standing  -     Place sequential compression device; Standing  -     Case Request Operating Room: ROBOTIC ARTHROPLASTY, KNEE, TOTAL  -     Place in Outpatient; Standing    Other orders  -     sodium chloride 0.9% flush 10 mL  -     IP VTE LOW RISK PATIENT; Standing  -     tranexamic acid (CYKLOKAPRON) 1,000 mg in sodium chloride 0.9% 100 mL  -     clindamycin (CLEOCIN) 900 mg in dextrose 5 % 50 mL IVPB            I had a long discussion with the patient regarding the benefits and risks of TKA. They voiced understanding and wish to proceed with surgery. Consents signed in clinic.

## 2022-06-01 ENCOUNTER — TELEPHONE (OUTPATIENT)
Dept: ORTHOPEDICS | Facility: CLINIC | Age: 72
End: 2022-06-01
Payer: MEDICARE

## 2022-06-01 NOTE — TELEPHONE ENCOUNTER
----- Message from Hernando Martinez sent at 6/1/2022 12:59 PM CDT -----  Regarding: pt wants to re surya Sx, call pt   Contact: pt   pt wants to re surya Sx, call pt

## 2022-06-06 ENCOUNTER — TELEPHONE (OUTPATIENT)
Dept: ORTHOPEDICS | Facility: CLINIC | Age: 72
End: 2022-06-06
Payer: MEDICARE

## 2022-06-06 NOTE — TELEPHONE ENCOUNTER
----- Message from Hernando Martinez sent at 6/6/2022  4:43 PM CDT -----  Regarding: pt needs to re [facility] tomorrows appts for CAT scan and pre op , call pt   Contact: pt   pt needs to re [facility] tomorrows appts for CAT scan and pre op , call pt

## 2022-06-06 NOTE — TELEPHONE ENCOUNTER
Spoke with pt and advise that she call [phone] to reschedule her  CT appointment and to call [phone] to reschedule her joint camp with pre op.

## 2022-06-10 ENCOUNTER — PES CALL (OUTPATIENT)
Dept: ADMINISTRATIVE | Facility: CLINIC | Age: 72
End: 2022-06-10
Payer: MEDICARE

## 2022-07-12 DIAGNOSIS — M54.9 BACK PAIN, UNSPECIFIED BACK LOCATION, UNSPECIFIED BACK PAIN LATERALITY, UNSPECIFIED CHRONICITY: ICD-10-CM

## 2022-07-12 DIAGNOSIS — Z76.0 MEDICATION REFILL: ICD-10-CM

## 2022-07-12 DIAGNOSIS — G47.00 INSOMNIA, UNSPECIFIED TYPE: ICD-10-CM

## 2022-07-12 DIAGNOSIS — Z79.899 ENCOUNTER FOR LONG-TERM (CURRENT) USE OF MEDICATIONS: ICD-10-CM

## 2022-07-12 RX ORDER — ALPRAZOLAM 0.5 MG/1
TABLET ORAL
Qty: 30 TABLET | Refills: 5 | OUTPATIENT
Start: 2022-07-12

## 2022-07-12 RX ORDER — GABAPENTIN 100 MG/1
CAPSULE ORAL
Qty: 90 CAPSULE | Refills: 4 | OUTPATIENT
Start: 2022-07-12

## 2022-07-12 NOTE — TELEPHONE ENCOUNTER
No new care gaps identified.  [facility] Embedded Care Gaps. Reference number: [id number]. 7/12/2022   1:07:08 PM CDT

## 2022-07-12 NOTE — TELEPHONE ENCOUNTER
Patient is due for an appointment.  Please schedule appointment with JOSE R for medication  refill.

## 2022-08-01 ENCOUNTER — LAB VISIT (OUTPATIENT)
Dept: LAB | Facility: HOSPITAL | Age: 72
End: 2022-08-01
Attending: NURSE PRACTITIONER
Payer: MEDICARE

## 2022-08-01 ENCOUNTER — OFFICE VISIT (OUTPATIENT)
Dept: FAMILY MEDICINE | Facility: CLINIC | Age: 72
End: 2022-08-01
Payer: MEDICARE

## 2022-08-01 VITALS
OXYGEN SATURATION: 98 % | HEIGHT: 62 IN | WEIGHT: 218 LBS | HEART RATE: 70 BPM | SYSTOLIC BLOOD PRESSURE: 138 MMHG | DIASTOLIC BLOOD PRESSURE: 86 MMHG | BODY MASS INDEX: 40.12 KG/M2 | TEMPERATURE: 99 F

## 2022-08-01 DIAGNOSIS — G47.00 INSOMNIA, UNSPECIFIED TYPE: ICD-10-CM

## 2022-08-01 DIAGNOSIS — Z76.0 MEDICATION REFILL: ICD-10-CM

## 2022-08-01 DIAGNOSIS — R05.9 COUGH: ICD-10-CM

## 2022-08-01 DIAGNOSIS — R53.83 FATIGUE, UNSPECIFIED TYPE: ICD-10-CM

## 2022-08-01 DIAGNOSIS — D53.9 NUTRITIONAL ANEMIA, UNSPECIFIED: ICD-10-CM

## 2022-08-01 DIAGNOSIS — J30.9 CHRONIC ALLERGIC RHINITIS: Primary | ICD-10-CM

## 2022-08-01 DIAGNOSIS — Z79.899 ENCOUNTER FOR LONG-TERM (CURRENT) USE OF MEDICATIONS: ICD-10-CM

## 2022-08-01 LAB
25(OH)D3+25(OH)D2 SERPL-MCNC: 34 NG/ML (ref 30–96)
CTP QC/QA: YES
ERYTHROCYTE [DISTWIDTH] IN BLOOD BY AUTOMATED COUNT: 13.1 % (ref 11.5–14.5)
HCT VFR BLD AUTO: 59.1 % (ref 37–48.5)
HGB BLD-MCNC: 19 G/DL (ref 12–16)
MCH RBC QN AUTO: 30.1 PG (ref 27–31)
MCHC RBC AUTO-ENTMCNC: 32.1 G/DL (ref 32–36)
MCV RBC AUTO: 94 FL (ref 82–98)
PLATELET # BLD AUTO: 200 K/UL (ref 150–450)
PMV BLD AUTO: 10.4 FL (ref 9.2–12.9)
RBC # BLD AUTO: 6.31 M/UL (ref 4–5.4)
SARS-COV-2 RDRP RESP QL NAA+PROBE: NEGATIVE
TSH SERPL DL<=0.005 MIU/L-ACNC: 0.75 UIU/ML (ref 0.4–4)
VIT B12 SERPL-MCNC: >2000 PG/ML (ref 210–950)
WBC # BLD AUTO: 4.15 K/UL (ref 3.9–12.7)

## 2022-08-01 PROCEDURE — 1159F PR MEDICATION LIST DOCUMENTED IN MEDICAL RECORD: ICD-10-PCS | Mod: CPTII,S$GLB,, | Performed by: NURSE PRACTITIONER

## 2022-08-01 PROCEDURE — 3288F FALL RISK ASSESSMENT DOCD: CPT | Mod: CPTII,S$GLB,, | Performed by: NURSE PRACTITIONER

## 2022-08-01 PROCEDURE — 3044F HG A1C LEVEL LT 7.0%: CPT | Mod: CPTII,S$GLB,, | Performed by: NURSE PRACTITIONER

## 2022-08-01 PROCEDURE — 82306 VITAMIN D 25 HYDROXY: CPT | Performed by: NURSE PRACTITIONER

## 2022-08-01 PROCEDURE — 36415 COLL VENOUS BLD VENIPUNCTURE: CPT | Mod: PO | Performed by: NURSE PRACTITIONER

## 2022-08-01 PROCEDURE — 3008F BODY MASS INDEX DOCD: CPT | Mod: CPTII,S$GLB,, | Performed by: NURSE PRACTITIONER

## 2022-08-01 PROCEDURE — 1159F MED LIST DOCD IN RCRD: CPT | Mod: CPTII,S$GLB,, | Performed by: NURSE PRACTITIONER

## 2022-08-01 PROCEDURE — 85027 COMPLETE CBC AUTOMATED: CPT | Performed by: NURSE PRACTITIONER

## 2022-08-01 PROCEDURE — 3079F PR MOST RECENT DIASTOLIC BLOOD PRESSURE 80-89 MM HG: ICD-10-PCS | Mod: CPTII,S$GLB,, | Performed by: NURSE PRACTITIONER

## 2022-08-01 PROCEDURE — 99999 PR PBB SHADOW E&M-EST. PATIENT-LVL IV: ICD-10-PCS | Mod: PBBFAC,,, | Performed by: NURSE PRACTITIONER

## 2022-08-01 PROCEDURE — 1125F AMNT PAIN NOTED PAIN PRSNT: CPT | Mod: CPTII,S$GLB,, | Performed by: NURSE PRACTITIONER

## 2022-08-01 PROCEDURE — 3075F SYST BP GE 130 - 139MM HG: CPT | Mod: CPTII,S$GLB,, | Performed by: NURSE PRACTITIONER

## 2022-08-01 PROCEDURE — 99999 PR PBB SHADOW E&M-EST. PATIENT-LVL IV: CPT | Mod: PBBFAC,,, | Performed by: NURSE PRACTITIONER

## 2022-08-01 PROCEDURE — 3008F PR BODY MASS INDEX (BMI) DOCUMENTED: ICD-10-PCS | Mod: CPTII,S$GLB,, | Performed by: NURSE PRACTITIONER

## 2022-08-01 PROCEDURE — 1101F PT FALLS ASSESS-DOCD LE1/YR: CPT | Mod: CPTII,S$GLB,, | Performed by: NURSE PRACTITIONER

## 2022-08-01 PROCEDURE — 1160F PR REVIEW ALL MEDS BY PRESCRIBER/CLIN PHARMACIST DOCUMENTED: ICD-10-PCS | Mod: CPTII,S$GLB,, | Performed by: NURSE PRACTITIONER

## 2022-08-01 PROCEDURE — 3288F PR FALLS RISK ASSESSMENT DOCUMENTED: ICD-10-PCS | Mod: CPTII,S$GLB,, | Performed by: NURSE PRACTITIONER

## 2022-08-01 PROCEDURE — 82607 VITAMIN B-12: CPT | Performed by: NURSE PRACTITIONER

## 2022-08-01 PROCEDURE — U0002 COVID-19 LAB TEST NON-CDC: HCPCS | Mod: QW,S$GLB,, | Performed by: NURSE PRACTITIONER

## 2022-08-01 PROCEDURE — 99214 OFFICE O/P EST MOD 30 MIN: CPT | Mod: S$GLB,,, | Performed by: NURSE PRACTITIONER

## 2022-08-01 PROCEDURE — 3079F DIAST BP 80-89 MM HG: CPT | Mod: CPTII,S$GLB,, | Performed by: NURSE PRACTITIONER

## 2022-08-01 PROCEDURE — 1101F PR PT FALLS ASSESS DOC 0-1 FALLS W/OUT INJ PAST YR: ICD-10-PCS | Mod: CPTII,S$GLB,, | Performed by: NURSE PRACTITIONER

## 2022-08-01 PROCEDURE — 1125F PR PAIN SEVERITY QUANTIFIED, PAIN PRESENT: ICD-10-PCS | Mod: CPTII,S$GLB,, | Performed by: NURSE PRACTITIONER

## 2022-08-01 PROCEDURE — 99214 PR OFFICE/OUTPT VISIT, EST, LEVL IV, 30-39 MIN: ICD-10-PCS | Mod: S$GLB,,, | Performed by: NURSE PRACTITIONER

## 2022-08-01 PROCEDURE — 3075F PR MOST RECENT SYSTOLIC BLOOD PRESS GE 130-139MM HG: ICD-10-PCS | Mod: CPTII,S$GLB,, | Performed by: NURSE PRACTITIONER

## 2022-08-01 PROCEDURE — 1160F RVW MEDS BY RX/DR IN RCRD: CPT | Mod: CPTII,S$GLB,, | Performed by: NURSE PRACTITIONER

## 2022-08-01 PROCEDURE — 3044F PR MOST RECENT HEMOGLOBIN A1C LEVEL <7.0%: ICD-10-PCS | Mod: CPTII,S$GLB,, | Performed by: NURSE PRACTITIONER

## 2022-08-01 PROCEDURE — U0002: ICD-10-PCS | Mod: QW,S$GLB,, | Performed by: NURSE PRACTITIONER

## 2022-08-01 PROCEDURE — 84443 ASSAY THYROID STIM HORMONE: CPT | Performed by: NURSE PRACTITIONER

## 2022-08-01 RX ORDER — CALCIUM CARBONATE 500(1250)
1 TABLET ORAL DAILY
Qty: 90 TABLET | Refills: 1 | Status: SHIPPED | OUTPATIENT
Start: 2022-08-01 | End: 2022-10-25 | Stop reason: SDUPTHER

## 2022-08-01 RX ORDER — FLUTICASONE PROPIONATE 50 MCG
1 SPRAY, SUSPENSION (ML) NASAL DAILY
Qty: 18.2 ML | Refills: 0 | Status: SHIPPED | OUTPATIENT
Start: 2022-08-01 | End: 2022-10-25 | Stop reason: SDUPTHER

## 2022-08-01 RX ORDER — ALPRAZOLAM 0.5 MG/1
TABLET ORAL
Qty: 30 TABLET | Refills: 5 | Status: SHIPPED | OUTPATIENT
Start: 2022-08-01 | End: 2023-01-09 | Stop reason: SDUPTHER

## 2022-08-01 RX ORDER — PROMETHAZINE HYDROCHLORIDE AND DEXTROMETHORPHAN HYDROBROMIDE 6.25; 15 MG/5ML; MG/5ML
5 SYRUP ORAL EVERY 6 HOURS PRN
Qty: 150 ML | Refills: 0 | Status: SHIPPED | OUTPATIENT
Start: 2022-08-01 | End: 2022-08-11

## 2022-08-01 RX ORDER — PANTOPRAZOLE SODIUM 40 MG/1
40 TABLET, DELAYED RELEASE ORAL DAILY
Qty: 90 TABLET | Refills: 1 | Status: SHIPPED | OUTPATIENT
Start: 2022-08-01 | End: 2022-11-05 | Stop reason: SDUPTHER

## 2022-08-01 RX ORDER — MONTELUKAST SODIUM 10 MG/1
10 TABLET ORAL NIGHTLY
Qty: 90 TABLET | Refills: 1 | Status: SHIPPED | OUTPATIENT
Start: 2022-08-01 | End: 2022-11-05 | Stop reason: SDUPTHER

## 2022-08-01 RX ORDER — CARVEDILOL 12.5 MG/1
TABLET ORAL
Qty: 180 TABLET | Refills: 1 | Status: SHIPPED | OUTPATIENT
Start: 2022-08-01 | End: 2022-11-16 | Stop reason: SDUPTHER

## 2022-08-01 RX ORDER — PRAVASTATIN SODIUM 20 MG/1
20 TABLET ORAL DAILY
Qty: 90 TABLET | Refills: 1 | Status: SHIPPED | OUTPATIENT
Start: 2022-08-01 | End: 2023-02-09 | Stop reason: SDUPTHER

## 2022-08-01 RX ORDER — GABAPENTIN 100 MG/1
CAPSULE ORAL
Qty: 90 CAPSULE | Refills: 2 | Status: SHIPPED | OUTPATIENT
Start: 2022-08-01 | End: 2022-12-13 | Stop reason: SDUPTHER

## 2022-08-01 NOTE — ASSESSMENT & PLAN NOTE
Complete history and physical was completed today.  Complete and thorough medication reconciliation was performed.  Discussed risks and benefits of medications.  Advised patient on orders and health maintenance. Continue current medications listed on your summary sheet.

## 2022-08-01 NOTE — ASSESSMENT & PLAN NOTE
Chronic, stable. Taking Xanax PRN at bedtime, requesting medication refill. Tolerating medication well with no adverse effects reported. Discussed insomnia condition course.  Advised of first-line medications for this condition.  Also discussed sleep hygiene.  Information was given below.  Good sleep habits (sometimes referred to as sleep hygiene) can help you get a good nights sleep.    Some habits that can improve your sleep health:  -Be consistent. Go to bed at the same time each night and get up at the same time each morning, including on the weekends  -Make sure your bedroom is quiet, dark, relaxing, and at a comfortable temperature  -Remove electronic devices, such as TVs, computers, and smart phones, from the bedroom  -Avoid large meals, caffeine, and alcohol before bedtime  -Get some exercise. Being physically active during the day can help you fall asleep more easily at night.

## 2022-08-01 NOTE — PROGRESS NOTES
"Assessment/Plan:  Problem List Items Addressed This Visit        ENT    Chronic allergic rhinitis - Primary    Overview     Chronic, problem. Intermittent control  She complains of postnasal drip, congestion, runny nose, cough, eye tearing  There has been no new pets. No known triggers. She is a nonsmoker.   She has tried Xyzal but reports medication causes drowsiness. She has tried taking medication in the evening but reports that medication "makes everything too dry"  She is currently taking Claritin with minimal improvement  Recommend continue Claritin, add Flonase and Singulair daily as prescribed            Relevant Medications    montelukast (SINGULAIR) 10 mg tablet    fluticasone propionate (FLONASE) 50 mcg/actuation nasal spray       Pulmonary    Cough    Overview     Chronic problem  Secondary to chronic allergies (see problem list)  She complains of congestion, postnasal drip, runny nose, and cough  No new pets  She is a nonsmoker  No history of asthma  She has tried tessalon perles with no improvement  She has taken promethazine DM in the past which was effective, requesting refill    She has pertinent history of GERD which is well controlled  She has had CXR 12/2021 which showed no acute pulmonary problems            Relevant Medications    promethazine-dextromethorphan (PROMETHAZINE-DM) 6.25-15 mg/5 mL Syrp    Other Relevant Orders    POCT COVID-19 Rapid Screening (Completed)       Other    Encounter for long-term (current) use of medications (Chronic)    Overview     August 16, 2019  CHRONIC long-term drug therapy for managed conditions. See medication list. Reports compliance.  No side effects reported.  Routine lab work is being monitored.  Patient does  need refills today. November 2019:  CHRONIC. Stable. Compliant with medications for managed conditions. See medication list. No SE reported. Routine lab analysis is being monitored. Refills were addressed.JUNE 2020:  CHRONIC. Stable. Compliant with " medications for managed conditions. See medication list. No SE reported. Routine lab analysis is being monitored. Refills were addressed.  April 2021:  Reviewed labs.  January 2022:  Reviewed labs.  Lab Results   Component Value Date    WBC 8.24 01/05/2022    HGB 13.9 01/05/2022    HCT 43.4 01/05/2022    MCV 93 01/05/2022     01/05/2022       Chemistry        Component Value Date/Time     01/05/2022 0911     05/20/2019 0000    K 4.5 01/05/2022 0911    K 4.4 05/20/2019 0000     01/05/2022 0911     05/20/2019 0000    CO2 23 01/05/2022 0911    CO2 28 05/20/2019 0000    BUN 15 01/05/2022 0911    BUN 15 05/20/2019 0000    CREATININE 0.7 01/05/2022 0911    CREATININE 0.8 05/20/2019 0000     01/05/2022 0911        Component Value Date/Time    CALCIUM 9.5 01/05/2022 0911    CALCIUM 9.4 05/20/2019 0000    ALKPHOS 139 (H) 01/05/2022 0911    ALKPHOS 156 05/20/2019 0000    AST 32 01/05/2022 0911    ALT 18 01/05/2022 0911    ALT 21 05/20/2019 0000    BILITOT 0.5 01/05/2022 0911    ESTGFRAFRICA >60.0 01/05/2022 0911    EGFRNONAA >60.0 01/05/2022 0911        Lab Results   Component Value Date    TSH 1.756 01/05/2022     ======================================================           Current Assessment & Plan     Complete history and physical was completed today.  Complete and thorough medication reconciliation was performed.  Discussed risks and benefits of medications.  Advised patient on orders and health maintenance. Continue current medications listed on your summary sheet.           Relevant Medications    ALPRAZolam (XANAX) 0.5 MG tablet    carvediloL (COREG) 12.5 MG tablet    gabapentin (NEURONTIN) 100 MG capsule    pravastatin (PRAVACHOL) 20 MG tablet    pantoprazole (PROTONIX) 40 MG tablet    Insomnia (Chronic)    Overview     Chronic.  Controlled.  Stable.  Reports compliance with Xanax.  No abuse noted. The patient was checked in the [facility] [employer] [facility] [employer]'s   Prescription Monitoring Program. There appears to be no incongruities with the patient's verbalized history.                Current Assessment & Plan     Chronic, stable. Taking Xanax PRN at bedtime, requesting medication refill. Tolerating medication well with no adverse effects reported. Discussed insomnia condition course.  Advised of first-line medications for this condition.  Also discussed sleep hygiene.  Information was given below.  Good sleep habits (sometimes referred to as sleep hygiene) can help you get a good nights sleep.    Some habits that can improve your sleep health:  -Be consistent. Go to bed at the same time each night and get up at the same time each morning, including on the weekends  -Make sure your bedroom is quiet, dark, relaxing, and at a comfortable temperature  -Remove electronic devices, such as TVs, computers, and smart phones, from the bedroom  -Avoid large meals, caffeine, and alcohol before bedtime  -Get some exercise. Being physically active during the day can help you fall asleep more easily at night.           Relevant Medications    ALPRAZolam (XANAX) 0.5 MG tablet    Medication refill    Overview     See long-term med use above.  Patient needs refills reports compliance.  No side effects reported.           Current Assessment & Plan     Discussed risks and benefits of medications.  Monitor for side effects.           Relevant Medications    ALPRAZolam (XANAX) 0.5 MG tablet    carvediloL (COREG) 12.5 MG tablet    gabapentin (NEURONTIN) 100 MG capsule    pravastatin (PRAVACHOL) 20 MG tablet    pantoprazole (PROTONIX) 40 MG tablet    Fatigue    Overview     Patient complains of fatigue. This is a chronic problem for several months.   She reports that she snores and often wakes feeling tired. She frequently wakes during the night to urinate. She reports that she has had a sleep study in the past but was unable to tolerate CPAP. She is not interested in repeating sleep study or  getting new CPAP at this time.     She has no history of anemia or thyroid problems. Denies any signs of active bleeding. There has been no hot/cold intolerances. Weight is up 8 lbs in 3 months. Will repeat CBC and TSH; also recommend check vitamin D and B12 today as well. She is taking Os-Trevon + D supplement.     She has a pertinent history of fibromyalgia and depression     Lab Results   Component Value Date    WBC 8.24 01/05/2022    HGB 13.9 01/05/2022    HCT 43.4 01/05/2022    MCV 93 01/05/2022     01/05/2022     Lab Results   Component Value Date    TSH 1.756 01/05/2022              Relevant Orders    CBC Without Differential    TSH    Vitamin D    Vitamin B12        Follow up in about 3 months (around 11/1/2022), or if symptoms worsen or fail to improve.    Melissa Hartman NP  _____________________________________________________________________________________________________________________________________________________    CC: medication refill; sinus problem    HPI: Patient is a 71-year-old female who presents in clinic today as an established patient here for medication refills. Chronic conditions have been reviewed and remains stable. Further details as stated above.     Sinus problem: This is an ongoing problem for several months. The problem has waxed and waned. There has been no fever. She is experiencing no pain. Associated symptoms include congestion, postnasal drip, cough, eye tearing. Pertinent negatives include no chills, diaphoresis, ear pain, headaches, hoarse voice, neck pain, shortness of breath, sneezing, sore throat or swollen glands. Past treatments include Xyzal and Claritin. The treatment provided mild relief. She has had no known sick contacts. She is fully vaccinated for covid.     Past Medical History:  Past Medical History:   Diagnosis Date    Arthritis     Asthma, moderate persistent     Depression     Encounter for long-term (current) use of medications 8/16/2019     August 16, 2019  Patient is on CHRONIC long-term drug therapy for managed conditions. See medication list. Reports compliance.  No side effects reported.  Routine lab work is being monitored.  Patient does  need refills today.   Lab Results Component Value Date  WBC 8.00 01/15/2013  HGB 13.6 01/15/2013  HCT 41.2 01/15/2013  MCV 86.2 01/15/2013   01/15/2013   CMP Sodium Date Value Ref Range     Fibromyalgia     GERD (gastroesophageal reflux disease)     HBP (high blood pressure)     Hyperlipemia     IBS (irritable bowel syndrome)     Migraines     Sleep apnea      Past Surgical History:   Procedure Laterality Date    bleeding ulcer      HYSTERECTOMY      JOINT REPLACEMENT      TONSILLECTOMY      TOTAL KNEE ARTHROPLASTY Left 2018    Dr. Dkue     Review of patient's allergies indicates:   Allergen Reactions    Budesonide-formoterol Shortness Of Breath     Throat closes up     Duloxetine Shortness Of Breath and Swelling    Meloxicam Shortness Of Breath and Swelling    Simvastatin Shortness Of Breath and Swelling    Zoloft [sertraline] Anaphylaxis     Tongue swelling    Amoxicillin-pot clavulanate Diarrhea    Histamine h2 inhibitors Other (See Comments)     Constipation    Metoclopramide Nausea And Vomiting    Metoprolol Other (See Comments)     Cough    Phenothiazines Other (See Comments)     Not sure of reaction.     Social History     Tobacco Use    Smoking status: Never Smoker    Smokeless tobacco: Never Used   Substance Use Topics    Alcohol use: No    Drug use: No     Family History   Problem Relation Age of Onset    COPD Maternal Uncle         colon     Current Outpatient Medications on File Prior to Visit   Medication Sig Dispense Refill    albuterol (PROVENTIL HFA) 90 mcg/actuation inhaler Inhale 2 puffs into the lungs every 6 (six) hours as needed. 1 Inhaler 3    albuterol (PROVENTIL/VENTOLIN HFA) 90 mcg/actuation inhaler Inhale 2 puffs into the lungs every 6 (six) hours as  needed for Wheezing. 18 g 1    albuterol sulfate 2.5 mg/0.5 mL Nebu Take 2.5 mg by nebulization every 6 (six) hours as needed.      butalbital-acetaminophen-caffeine -40 mg (FIORICET, ESGIC) -40 mg per tablet TAKE ONE TABLET BY MOUTH EVERY 4 HOURS AS NEEDED 30 tablet 4    diclofenac sodium (VOLTAREN) 1 % Gel Apply small amount (2 grams) to painful joint area 4 times daily as needed 100 g 1    etodolac (LODINE) 400 MG tablet Take 1 tablet (400 mg total) by mouth 2 (two) times daily. 60 tablet 0    melatonin 3 mg Tab Take 3 mg by mouth every evening.      rizatriptan (MAXALT) 10 MG tablet Take 10 mg by mouth as needed for Migraine.      venlafaxine (EFFEXOR-XR) 75 MG 24 hr capsule TAKE ONE CAPSULE BY MOUTH EVERY DAY 90 capsule 4    [DISCONTINUED] ALPRAZolam (XANAX) 0.5 MG tablet TAKE ONE TABLET BY MOUTH EVERY NIGHT AT BEDTIME AS NEEDED FOR ANXIETY 30 tablet 5    [DISCONTINUED] carvediloL (COREG) 12.5 MG tablet TAKE ONE TABLET BY MOUTH TWICE DAILY WITH A MEAL 180 tablet 4    [DISCONTINUED] gabapentin (NEURONTIN) 100 MG capsule TAKE ONE CAPSULE BY MOUTH THREE TIMES DAILY AS NEEDED FOR BACK PAIN 90 capsule 4    [DISCONTINUED] pantoprazole (PROTONIX) 40 MG tablet TAKE ONE TABLET BY MOUTH EVERY DAY 90 tablet 4    [DISCONTINUED] pravastatin (PRAVACHOL) 20 MG tablet Take 1 tablet (20 mg total) by mouth once daily. 90 tablet 3    [DISCONTINUED] calcium carbonate (OS-MARY) 500 mg calcium (1,250 mg) tablet Take 1 tablet (500 mg total) by mouth 3 (three) times daily. 90 tablet 11     Current Facility-Administered Medications on File Prior to Visit   Medication Dose Route Frequency Provider Last Rate Last Admin    sodium chloride 0.9% flush 10 mL  10 mL Intravenous Q6H PRN Uriah Duke MD         Review of Systems   Constitutional: Positive for fatigue and unexpected weight change. Negative for chills and fever.   HENT: Positive for congestion, postnasal drip and rhinorrhea. Negative for ear pain and  "sore throat.    Eyes: Positive for discharge (tearing). Negative for redness and visual disturbance.   Respiratory: Positive for cough. Negative for shortness of breath and wheezing.    Cardiovascular: Negative for chest pain and palpitations.   Gastrointestinal: Negative for nausea and vomiting.   Endocrine: Negative for cold intolerance and heat intolerance.   Genitourinary: Negative for difficulty urinating and hematuria.   Musculoskeletal: Positive for arthralgias. Negative for myalgias.   Skin: Negative for rash and wound.   Allergic/Immunologic: Positive for environmental allergies.   Neurological: Negative for weakness and headaches.   Psychiatric/Behavioral: Positive for sleep disturbance. The patient is nervous/anxious.      Vitals:    08/01/22 0911   BP: 138/86   BP Location: Right arm   Pulse: 70   Temp: 98.7 °F (37.1 °C)   TempSrc: Oral   SpO2: 98%   Weight: 98.9 kg (218 lb)   Height: 5' 2" (1.575 m)     Wt Readings from Last 3 Encounters:   08/01/22 98.9 kg (218 lb)   05/31/22 95.3 kg (210 lb)   01/05/22 95.5 kg (210 lb 9.6 oz)     Physical Exam  Vitals reviewed.   Constitutional:       General: She is not in acute distress.     Appearance: Normal appearance. She is obese. She is not ill-appearing.   HENT:      Head: Normocephalic and atraumatic.      Right Ear: Tympanic membrane and external ear normal.      Left Ear: Tympanic membrane and external ear normal.      Nose: Congestion present.      Mouth/Throat:      Mouth: Mucous membranes are moist.      Pharynx: No posterior oropharyngeal erythema.   Eyes:      Extraocular Movements: Extraocular movements intact.      Conjunctiva/sclera: Conjunctivae normal.   Cardiovascular:      Rate and Rhythm: Normal rate.      Heart sounds: Normal heart sounds.   Pulmonary:      Effort: Pulmonary effort is normal. No respiratory distress.      Breath sounds: Normal breath sounds.   Abdominal:      General: Abdomen is flat. There is no distension. "   Musculoskeletal:         General: Tenderness present. Normal range of motion.      Cervical back: Normal range of motion and neck supple.   Skin:     General: Skin is warm and dry.      Coloration: Skin is not pale.      Findings: No rash.   Neurological:      Mental Status: She is alert and oriented to person, place, and time. Mental status is at baseline.   Psychiatric:         Mood and Affect: Mood normal.         Speech: Speech normal.         Behavior: Behavior normal. Behavior is cooperative.       Health Maintenance   Topic Date Due    TETANUS VACCINE  Never done    Mammogram  05/20/2020    DEXA Scan  11/11/2022    Lipid Panel  01/05/2023    Hepatitis C Screening  Completed

## 2022-08-09 ENCOUNTER — PATIENT MESSAGE (OUTPATIENT)
Dept: ADMINISTRATIVE | Facility: HOSPITAL | Age: 72
End: 2022-08-09
Payer: MEDICARE

## 2022-08-15 DIAGNOSIS — D75.839 THROMBOCYTOSIS: Primary | ICD-10-CM

## 2022-08-15 NOTE — PROGRESS NOTES
1st check to see if patient has seen the results.  If not then  CALL patient with results and Document verification.  Schedule follow-up if needed.  [phone]

## 2022-08-16 ENCOUNTER — LAB VISIT (OUTPATIENT)
Dept: LAB | Facility: HOSPITAL | Age: 72
End: 2022-08-16
Attending: FAMILY MEDICINE
Payer: MEDICARE

## 2022-08-16 DIAGNOSIS — D75.839 THROMBOCYTOSIS: ICD-10-CM

## 2022-08-16 LAB
BASOPHILS # BLD AUTO: 0.1 K/UL (ref 0–0.2)
BASOPHILS NFR BLD: 1.2 % (ref 0–1.9)
DIFFERENTIAL METHOD: ABNORMAL
EOSINOPHIL # BLD AUTO: 0.5 K/UL (ref 0–0.5)
EOSINOPHIL NFR BLD: 5.7 % (ref 0–8)
ERYTHROCYTE [DISTWIDTH] IN BLOOD BY AUTOMATED COUNT: 13.4 % (ref 11.5–14.5)
HCT VFR BLD AUTO: 41.1 % (ref 37–48.5)
HGB BLD-MCNC: 13.1 G/DL (ref 12–16)
IMM GRANULOCYTES # BLD AUTO: 0.03 K/UL (ref 0–0.04)
IMM GRANULOCYTES NFR BLD AUTO: 0.4 % (ref 0–0.5)
LYMPHOCYTES # BLD AUTO: 2.8 K/UL (ref 1–4.8)
LYMPHOCYTES NFR BLD: 33.1 % (ref 18–48)
MCH RBC QN AUTO: 30 PG (ref 27–31)
MCHC RBC AUTO-ENTMCNC: 31.9 G/DL (ref 32–36)
MCV RBC AUTO: 94 FL (ref 82–98)
MONOCYTES # BLD AUTO: 0.7 K/UL (ref 0.3–1)
MONOCYTES NFR BLD: 7.6 % (ref 4–15)
NEUTROPHILS # BLD AUTO: 4.4 K/UL (ref 1.8–7.7)
NEUTROPHILS NFR BLD: 52 % (ref 38–73)
NRBC BLD-RTO: 0 /100 WBC
PATH REV BLD -IMP: NORMAL
PLATELET # BLD AUTO: 262 K/UL (ref 150–450)
PMV BLD AUTO: 9.7 FL (ref 9.2–12.9)
RBC # BLD AUTO: 4.37 M/UL (ref 4–5.4)
WBC # BLD AUTO: 8.54 K/UL (ref 3.9–12.7)

## 2022-08-16 PROCEDURE — 85025 COMPLETE CBC W/AUTO DIFF WBC: CPT | Performed by: FAMILY MEDICINE

## 2022-08-16 PROCEDURE — 36415 COLL VENOUS BLD VENIPUNCTURE: CPT | Mod: PO | Performed by: FAMILY MEDICINE

## 2022-08-17 ENCOUNTER — OFFICE VISIT (OUTPATIENT)
Dept: FAMILY MEDICINE | Facility: CLINIC | Age: 72
End: 2022-08-17
Payer: MEDICARE

## 2022-08-17 VITALS
WEIGHT: 228.69 LBS | SYSTOLIC BLOOD PRESSURE: 136 MMHG | HEIGHT: 62 IN | BODY MASS INDEX: 42.08 KG/M2 | DIASTOLIC BLOOD PRESSURE: 84 MMHG | OXYGEN SATURATION: 99 % | HEART RATE: 71 BPM | TEMPERATURE: 97 F

## 2022-08-17 DIAGNOSIS — H66.92 ACUTE LEFT OTITIS MEDIA: Primary | ICD-10-CM

## 2022-08-17 DIAGNOSIS — J30.9 CHRONIC ALLERGIC RHINITIS: ICD-10-CM

## 2022-08-17 PROCEDURE — 1101F PR PT FALLS ASSESS DOC 0-1 FALLS W/OUT INJ PAST YR: ICD-10-PCS | Mod: CPTII,S$GLB,, | Performed by: PHYSICIAN ASSISTANT

## 2022-08-17 PROCEDURE — 3008F PR BODY MASS INDEX (BMI) DOCUMENTED: ICD-10-PCS | Mod: CPTII,S$GLB,, | Performed by: PHYSICIAN ASSISTANT

## 2022-08-17 PROCEDURE — 3008F BODY MASS INDEX DOCD: CPT | Mod: CPTII,S$GLB,, | Performed by: PHYSICIAN ASSISTANT

## 2022-08-17 PROCEDURE — 3075F SYST BP GE 130 - 139MM HG: CPT | Mod: CPTII,S$GLB,, | Performed by: PHYSICIAN ASSISTANT

## 2022-08-17 PROCEDURE — 3079F PR MOST RECENT DIASTOLIC BLOOD PRESSURE 80-89 MM HG: ICD-10-PCS | Mod: CPTII,S$GLB,, | Performed by: PHYSICIAN ASSISTANT

## 2022-08-17 PROCEDURE — 99214 OFFICE O/P EST MOD 30 MIN: CPT | Mod: S$GLB,,, | Performed by: PHYSICIAN ASSISTANT

## 2022-08-17 PROCEDURE — 99214 PR OFFICE/OUTPT VISIT, EST, LEVL IV, 30-39 MIN: ICD-10-PCS | Mod: S$GLB,,, | Performed by: PHYSICIAN ASSISTANT

## 2022-08-17 PROCEDURE — 3044F HG A1C LEVEL LT 7.0%: CPT | Mod: CPTII,S$GLB,, | Performed by: PHYSICIAN ASSISTANT

## 2022-08-17 PROCEDURE — 3288F FALL RISK ASSESSMENT DOCD: CPT | Mod: CPTII,S$GLB,, | Performed by: PHYSICIAN ASSISTANT

## 2022-08-17 PROCEDURE — 1125F AMNT PAIN NOTED PAIN PRSNT: CPT | Mod: CPTII,S$GLB,, | Performed by: PHYSICIAN ASSISTANT

## 2022-08-17 PROCEDURE — 3079F DIAST BP 80-89 MM HG: CPT | Mod: CPTII,S$GLB,, | Performed by: PHYSICIAN ASSISTANT

## 2022-08-17 PROCEDURE — 1159F PR MEDICATION LIST DOCUMENTED IN MEDICAL RECORD: ICD-10-PCS | Mod: CPTII,S$GLB,, | Performed by: PHYSICIAN ASSISTANT

## 2022-08-17 PROCEDURE — 1160F RVW MEDS BY RX/DR IN RCRD: CPT | Mod: CPTII,S$GLB,, | Performed by: PHYSICIAN ASSISTANT

## 2022-08-17 PROCEDURE — 1125F PR PAIN SEVERITY QUANTIFIED, PAIN PRESENT: ICD-10-PCS | Mod: CPTII,S$GLB,, | Performed by: PHYSICIAN ASSISTANT

## 2022-08-17 PROCEDURE — 1160F PR REVIEW ALL MEDS BY PRESCRIBER/CLIN PHARMACIST DOCUMENTED: ICD-10-PCS | Mod: CPTII,S$GLB,, | Performed by: PHYSICIAN ASSISTANT

## 2022-08-17 PROCEDURE — 1159F MED LIST DOCD IN RCRD: CPT | Mod: CPTII,S$GLB,, | Performed by: PHYSICIAN ASSISTANT

## 2022-08-17 PROCEDURE — 1101F PT FALLS ASSESS-DOCD LE1/YR: CPT | Mod: CPTII,S$GLB,, | Performed by: PHYSICIAN ASSISTANT

## 2022-08-17 PROCEDURE — 3288F PR FALLS RISK ASSESSMENT DOCUMENTED: ICD-10-PCS | Mod: CPTII,S$GLB,, | Performed by: PHYSICIAN ASSISTANT

## 2022-08-17 PROCEDURE — 3075F PR MOST RECENT SYSTOLIC BLOOD PRESS GE 130-139MM HG: ICD-10-PCS | Mod: CPTII,S$GLB,, | Performed by: PHYSICIAN ASSISTANT

## 2022-08-17 PROCEDURE — 99999 PR PBB SHADOW E&M-EST. PATIENT-LVL V: CPT | Mod: PBBFAC,,, | Performed by: PHYSICIAN ASSISTANT

## 2022-08-17 PROCEDURE — 3044F PR MOST RECENT HEMOGLOBIN A1C LEVEL <7.0%: ICD-10-PCS | Mod: CPTII,S$GLB,, | Performed by: PHYSICIAN ASSISTANT

## 2022-08-17 PROCEDURE — 99999 PR PBB SHADOW E&M-EST. PATIENT-LVL V: ICD-10-PCS | Mod: PBBFAC,,, | Performed by: PHYSICIAN ASSISTANT

## 2022-08-17 RX ORDER — AMOXICILLIN AND CLAVULANATE POTASSIUM 875; 125 MG/1; MG/1
1 TABLET, FILM COATED ORAL 2 TIMES DAILY
Qty: 14 TABLET | Refills: 0 | Status: SHIPPED | OUTPATIENT
Start: 2022-08-17 | End: 2022-08-24

## 2022-08-17 NOTE — PROGRESS NOTES
Assessment/Plan:    Problem List Items Addressed This Visit        ENT    Chronic allergic rhinitis    Overview     -chronic, problem; intermittent control  -complains of postnasal drip, congestion, runny nose, cough, eye tearing  -there has been no new pets, no known triggers, nonsmoker  -on Claritin with minimal improvement, has tried Xyzal but reports medication causes drowsiness  -recently prescribed Flonase and Singulair, but discontinued due to adverse reaction  -will refer to ENT for further evaluation            Relevant Orders    Ambulatory referral/consult to ENT    Acute left otitis media - Primary    Overview     -start oral antibiotics as prescribed  -recommend OTC Tylenol/Ibuprofen for pain/fever  -follow up with ENT if no improvement or worsening of symptoms             Relevant Medications    amoxicillin-clavulanate 875-125mg (AUGMENTIN) 875-125 mg per tablet    Other Relevant Orders    Ambulatory referral/consult to ENT          Follow up if symptoms worsen or fail to improve.    Berenice Zarate PA-C  _____________________________________________________________________________________________________________________________________________________    CC: left ear pain    HPI: Patient is in clinic today as an established patient here for left ear pain. Symptom onset was about 1 month ago and has worsened since that time. Patient reports associated fever (Tmax of 102) and left ear drainage. She denies diaphoresis, headaches, hoarse voice, neck pain, shortness of breath, sneezing, sore throat or swollen glands. She was on Amoxicillin >1 week ago for dental infection which did not help with ear pain. She denies prior history of ear infections. Patient also has a history of chronic allergic rhinitis in which she has postnasal drip, congestion, runny nose, cough, and eye tearing. She takes a daily antihistamine. She was recently prescribed Flonase and Singulair, however, she discontinued the medication  due to adverse reaction. No other complaints today.     Past Medical History:   Diagnosis Date    Arthritis     Asthma, moderate persistent     Depression     Encounter for long-term (current) use of medications 8/16/2019 August 16, 2019  Patient is on CHRONIC long-term drug therapy for managed conditions. See medication list. Reports compliance.  No side effects reported.  Routine lab work is being monitored.  Patient does  need refills today.   Lab Results Component Value Date  WBC 8.00 01/15/2013  HGB 13.6 01/15/2013  HCT 41.2 01/15/2013  MCV 86.2 01/15/2013   01/15/2013   CMP Sodium Date Value Ref Range     Fibromyalgia     GERD (gastroesophageal reflux disease)     HBP (high blood pressure)     Hyperlipemia     IBS (irritable bowel syndrome)     Migraines     Sleep apnea      Past Surgical History:   Procedure Laterality Date    bleeding ulcer      HYSTERECTOMY      JOINT REPLACEMENT      TONSILLECTOMY      TOTAL KNEE ARTHROPLASTY Left 2018    Dr. Duke     Review of patient's allergies indicates:   Allergen Reactions    Budesonide-formoterol Shortness Of Breath     Throat closes up     Duloxetine Shortness Of Breath and Swelling    Meloxicam Shortness Of Breath and Swelling    Simvastatin Shortness Of Breath and Swelling    Zoloft [sertraline] Anaphylaxis     Tongue swelling    Amoxicillin-pot clavulanate Diarrhea    Histamine h2 inhibitors Other (See Comments)     Constipation    Metoclopramide Nausea And Vomiting    Metoprolol Other (See Comments)     Cough    Phenothiazines Other (See Comments)     Not sure of reaction.     Social History     Tobacco Use    Smoking status: Never Smoker    Smokeless tobacco: Never Used   Substance Use Topics    Alcohol use: No    Drug use: No     Family History   Problem Relation Age of Onset    COPD Maternal Uncle         colon     Current Outpatient Medications on File Prior to Visit   Medication Sig Dispense Refill    albuterol  (PROVENTIL HFA) 90 mcg/actuation inhaler Inhale 2 puffs into the lungs every 6 (six) hours as needed. 1 Inhaler 3    albuterol (PROVENTIL/VENTOLIN HFA) 90 mcg/actuation inhaler Inhale 2 puffs into the lungs every 6 (six) hours as needed for Wheezing. 18 g 1    albuterol sulfate 2.5 mg/0.5 mL Nebu Take 2.5 mg by nebulization every 6 (six) hours as needed.      ALPRAZolam (XANAX) 0.5 MG tablet TAKE ONE TABLET BY MOUTH EVERY NIGHT AT BEDTIME AS NEEDED FOR ANXIETY 30 tablet 5    butalbital-acetaminophen-caffeine -40 mg (FIORICET, ESGIC) -40 mg per tablet TAKE ONE TABLET BY MOUTH EVERY 4 HOURS AS NEEDED 30 tablet 4    calcium carbonate (OS-MARY) 500 mg calcium (1,250 mg) tablet Take 1 tablet (500 mg total) by mouth once daily. 90 tablet 1    carvediloL (COREG) 12.5 MG tablet TAKE ONE TABLET BY MOUTH TWICE DAILY WITH A MEAL 180 tablet 1    diclofenac sodium (VOLTAREN) 1 % Gel Apply small amount (2 grams) to painful joint area 4 times daily as needed 100 g 1    etodolac (LODINE) 400 MG tablet Take 1 tablet (400 mg total) by mouth 2 (two) times daily. 60 tablet 0    fluticasone propionate (FLONASE) 50 mcg/actuation nasal spray 1 spray (50 mcg total) by Each Nostril route once daily. 18.2 mL 0    gabapentin (NEURONTIN) 100 MG capsule TAKE ONE CAPSULE BY MOUTH THREE TIMES DAILY AS NEEDED FOR BACK PAIN 90 capsule 2    melatonin 3 mg Tab Take 3 mg by mouth every evening.      montelukast (SINGULAIR) 10 mg tablet Take 1 tablet (10 mg total) by mouth every evening. 90 tablet 1    pantoprazole (PROTONIX) 40 MG tablet Take 1 tablet (40 mg total) by mouth once daily. 90 tablet 1    pravastatin (PRAVACHOL) 20 MG tablet Take 1 tablet (20 mg total) by mouth once daily. 90 tablet 1    rizatriptan (MAXALT) 10 MG tablet Take 10 mg by mouth as needed for Migraine.      venlafaxine (EFFEXOR-XR) 75 MG 24 hr capsule TAKE ONE CAPSULE BY MOUTH EVERY DAY 90 capsule 4     Current Facility-Administered Medications on  "File Prior to Visit   Medication Dose Route Frequency Provider Last Rate Last Admin    sodium chloride 0.9% flush 10 mL  10 mL Intravenous Q6H PRN Uriah Duke MD           Review of Systems   Constitutional: Positive for fever. Negative for chills, diaphoresis and fatigue.   HENT: Positive for ear discharge and ear pain. Negative for congestion, postnasal drip, sinus pain and sore throat.    Eyes: Negative for pain and redness.   Respiratory: Negative for cough, chest tightness and shortness of breath.    Cardiovascular: Negative for chest pain and leg swelling.   Gastrointestinal: Negative for abdominal pain, constipation, diarrhea, nausea and vomiting.   Genitourinary: Negative for dysuria and hematuria.   Musculoskeletal: Negative for arthralgias and joint swelling.   Skin: Negative for rash.   Neurological: Negative for dizziness, syncope and headaches.       Vitals:    08/17/22 1344   BP: 136/84   Pulse: 71   Temp: 97.1 °F (36.2 °C)   TempSrc: Other (see comments)   SpO2: 99%   Weight: 103.7 kg (228 lb 11.2 oz)   Height: 5' 2" (1.575 m)       Wt Readings from Last 3 Encounters:   08/17/22 103.7 kg (228 lb 11.2 oz)   08/01/22 98.9 kg (218 lb)   05/31/22 95.3 kg (210 lb)       Physical Exam  Constitutional:       General: She is not in acute distress.     Appearance: Normal appearance. She is well-developed.   HENT:      Head: Normocephalic and atraumatic.      Right Ear: Tympanic membrane normal.      Left Ear: Drainage and tenderness present. Tympanic membrane is erythematous.      Nose: Nose normal.      Mouth/Throat:      Mouth: Mucous membranes are moist.      Pharynx: Oropharynx is clear.   Eyes:      Conjunctiva/sclera: Conjunctivae normal.   Cardiovascular:      Rate and Rhythm: Normal rate and regular rhythm.      Pulses: Normal pulses.      Heart sounds: Normal heart sounds. No murmur heard.  Pulmonary:      Effort: Pulmonary effort is normal. No respiratory distress.      Breath sounds: Normal " breath sounds.   Abdominal:      General: Bowel sounds are normal. There is no distension.      Palpations: Abdomen is soft.      Tenderness: There is no abdominal tenderness.   Musculoskeletal:         General: Normal range of motion.      Cervical back: Normal range of motion and neck supple.   Skin:     General: Skin is warm and dry.      Findings: No rash.   Neurological:      General: No focal deficit present.      Mental Status: She is alert and oriented to person, place, and time.         Health Maintenance   Topic Date Due    TETANUS VACCINE  Never done    Mammogram  05/20/2020    DEXA Scan  11/11/2022    Lipid Panel  01/05/2023    Hepatitis C Screening  Completed

## 2022-08-18 NOTE — PROGRESS NOTES
Make follow-up lab appointment per recommendation below.  Check to see if patient has seen the results through my chart.  If not then,  #CALL THE PATIENT# to discuss results/see if they have questions and document verification of contact. Make F/U appt if needed. [phone]    #My interpretation that was sent to them through [employer]:  Maria M, I have reviewed your recent blood work.     Your complete blood count is improved.  It is now normal.    =========================  Also please address any outstanding health maintenance that may be due: TETANUS VACCINE Never done  Shingles Vaccine(1 of 2) Never done  Mammogram due on 05/20/2020  COVID-19 Vaccine(4 - Booster for Moderna series) due on 03/02/2022

## 2022-09-12 ENCOUNTER — TELEPHONE (OUTPATIENT)
Dept: ORTHOPEDICS | Facility: CLINIC | Age: 72
End: 2022-09-12
Payer: MEDICARE

## 2022-09-12 NOTE — TELEPHONE ENCOUNTER
----- Message from Mariana Nobles MA sent at 9/12/2022  4:09 PM CDT -----  Contact: pt  Pt wants to schedule surgery   Call back

## 2022-09-14 ENCOUNTER — PES CALL (OUTPATIENT)
Dept: ADMINISTRATIVE | Facility: CLINIC | Age: 72
End: 2022-09-14
Payer: MEDICARE

## 2022-10-03 ENCOUNTER — TELEPHONE (OUTPATIENT)
Dept: ORTHOPEDICS | Facility: CLINIC | Age: 72
End: 2022-10-03
Payer: MEDICARE

## 2022-10-03 NOTE — TELEPHONE ENCOUNTER
----- Message from Mariana Nobles MA sent at 10/3/2022 10:43 AM CDT -----  Contact: pt  Wants to schedule her surgery   Call back

## 2022-10-04 ENCOUNTER — PATIENT MESSAGE (OUTPATIENT)
Dept: ADMINISTRATIVE | Facility: HOSPITAL | Age: 72
End: 2022-10-04
Payer: MEDICARE

## 2022-10-13 ENCOUNTER — TELEPHONE (OUTPATIENT)
Dept: FAMILY MEDICINE | Facility: CLINIC | Age: 72
End: 2022-10-13
Payer: MEDICARE

## 2022-10-13 ENCOUNTER — OFFICE VISIT (OUTPATIENT)
Dept: ORTHOPEDICS | Facility: CLINIC | Age: 72
End: 2022-10-13
Payer: MEDICARE

## 2022-10-13 VITALS — HEIGHT: 62 IN | WEIGHT: 228 LBS | BODY MASS INDEX: 41.96 KG/M2

## 2022-10-13 DIAGNOSIS — M17.11 PRIMARY OSTEOARTHRITIS OF RIGHT KNEE: Primary | ICD-10-CM

## 2022-10-13 PROCEDURE — 99999 PR PBB SHADOW E&M-EST. PATIENT-LVL III: ICD-10-PCS | Mod: PBBFAC,,, | Performed by: ORTHOPAEDIC SURGERY

## 2022-10-13 PROCEDURE — 1101F PT FALLS ASSESS-DOCD LE1/YR: CPT | Mod: CPTII,S$GLB,, | Performed by: ORTHOPAEDIC SURGERY

## 2022-10-13 PROCEDURE — 1160F RVW MEDS BY RX/DR IN RCRD: CPT | Mod: CPTII,S$GLB,, | Performed by: ORTHOPAEDIC SURGERY

## 2022-10-13 PROCEDURE — 3288F PR FALLS RISK ASSESSMENT DOCUMENTED: ICD-10-PCS | Mod: CPTII,S$GLB,, | Performed by: ORTHOPAEDIC SURGERY

## 2022-10-13 PROCEDURE — 1159F MED LIST DOCD IN RCRD: CPT | Mod: CPTII,S$GLB,, | Performed by: ORTHOPAEDIC SURGERY

## 2022-10-13 PROCEDURE — 99999 PR PBB SHADOW E&M-EST. PATIENT-LVL III: CPT | Mod: PBBFAC,,, | Performed by: ORTHOPAEDIC SURGERY

## 2022-10-13 PROCEDURE — 1159F PR MEDICATION LIST DOCUMENTED IN MEDICAL RECORD: ICD-10-PCS | Mod: CPTII,S$GLB,, | Performed by: ORTHOPAEDIC SURGERY

## 2022-10-13 PROCEDURE — 3044F HG A1C LEVEL LT 7.0%: CPT | Mod: CPTII,S$GLB,, | Performed by: ORTHOPAEDIC SURGERY

## 2022-10-13 PROCEDURE — 99214 PR OFFICE/OUTPT VISIT, EST, LEVL IV, 30-39 MIN: ICD-10-PCS | Mod: S$GLB,,, | Performed by: ORTHOPAEDIC SURGERY

## 2022-10-13 PROCEDURE — 1160F PR REVIEW ALL MEDS BY PRESCRIBER/CLIN PHARMACIST DOCUMENTED: ICD-10-PCS | Mod: CPTII,S$GLB,, | Performed by: ORTHOPAEDIC SURGERY

## 2022-10-13 PROCEDURE — 1101F PR PT FALLS ASSESS DOC 0-1 FALLS W/OUT INJ PAST YR: ICD-10-PCS | Mod: CPTII,S$GLB,, | Performed by: ORTHOPAEDIC SURGERY

## 2022-10-13 PROCEDURE — 3044F PR MOST RECENT HEMOGLOBIN A1C LEVEL <7.0%: ICD-10-PCS | Mod: CPTII,S$GLB,, | Performed by: ORTHOPAEDIC SURGERY

## 2022-10-13 PROCEDURE — 1125F AMNT PAIN NOTED PAIN PRSNT: CPT | Mod: CPTII,S$GLB,, | Performed by: ORTHOPAEDIC SURGERY

## 2022-10-13 PROCEDURE — 1125F PR PAIN SEVERITY QUANTIFIED, PAIN PRESENT: ICD-10-PCS | Mod: CPTII,S$GLB,, | Performed by: ORTHOPAEDIC SURGERY

## 2022-10-13 PROCEDURE — 99214 OFFICE O/P EST MOD 30 MIN: CPT | Mod: S$GLB,,, | Performed by: ORTHOPAEDIC SURGERY

## 2022-10-13 PROCEDURE — 3288F FALL RISK ASSESSMENT DOCD: CPT | Mod: CPTII,S$GLB,, | Performed by: ORTHOPAEDIC SURGERY

## 2022-10-13 NOTE — TELEPHONE ENCOUNTER
----- Message from Jane Cote sent at 10/13/2022 10:11 AM CDT -----  Contact: Maria M morales [phone]  [email or web address]     Patient is calling for Medical Advice regarding:    How long has patient had these symptoms:    Pharmacy name and phone#:    Would like response via eYekat:call back    Comments: Pt is requesting a call back from the nurse because she needs a medical clearance for a procedure she is having in 2 weeks

## 2022-10-13 NOTE — TELEPHONE ENCOUNTER
Patient has pre op appointment scheduled y=with Kathleen Lange for 10/14/2022, but she states that her paperwork needs to signed off by MD.  Will you sign if Ms. Wang completes the preop?

## 2022-10-13 NOTE — H&P
Chief Complaint   Patient presents with    Right Knee - Pain, Swelling         HPI:   This is a 71 y.o. who presents to clinic today complaining of right knee pain for 2 years after no known trauma. Pain is progressively worsening. No numbness or tingling. No associated signs or symptoms. She has failed NSAIDs, PT, and injections.     Past Medical History:   Diagnosis Date    Arthritis     Asthma, moderate persistent     Depression     Encounter for long-term (current) use of medications 8/16/2019 August 16, 2019  Patient is on CHRONIC long-term drug therapy for managed conditions. See medication list. Reports compliance.  No side effects reported.  Routine lab work is being monitored.  Patient does  need refills today.   Lab Results Component Value Date  WBC 8.00 01/15/2013  HGB 13.6 01/15/2013  HCT 41.2 01/15/2013  MCV 86.2 01/15/2013   01/15/2013   CMP Sodium Date Value Ref Range     Fibromyalgia     GERD (gastroesophageal reflux disease)     HBP (high blood pressure)     Hyperlipemia     IBS (irritable bowel syndrome)     Migraines     Sleep apnea      Past Surgical History:   Procedure Laterality Date    bleeding ulcer      HYSTERECTOMY      JOINT REPLACEMENT      TONSILLECTOMY      TOTAL KNEE ARTHROPLASTY Left 2018    Dr. Duke     Current Outpatient Medications on File Prior to Visit   Medication Sig Dispense Refill    albuterol (PROVENTIL HFA) 90 mcg/actuation inhaler Inhale 2 puffs into the lungs every 6 (six) hours as needed. 1 Inhaler 3    albuterol (PROVENTIL/VENTOLIN HFA) 90 mcg/actuation inhaler Inhale 2 puffs into the lungs every 6 (six) hours as needed for Wheezing. 18 g 1    albuterol sulfate 2.5 mg/0.5 mL Nebu Take 2.5 mg by nebulization every 6 (six) hours as needed.      ALPRAZolam (XANAX) 0.5 MG tablet TAKE ONE TABLET BY MOUTH EVERY NIGHT AT BEDTIME AS NEEDED FOR ANXIETY 30 tablet 5    butalbital-acetaminophen-caffeine -40 mg (FIORICET, ESGIC) -40 mg per tablet TAKE ONE  TABLET BY MOUTH EVERY 4 HOURS AS NEEDED 30 tablet 4    calcium carbonate (OS-MARY) 500 mg calcium (1,250 mg) tablet Take 1 tablet (500 mg total) by mouth once daily. 90 tablet 1    carvediloL (COREG) 12.5 MG tablet TAKE ONE TABLET BY MOUTH TWICE DAILY WITH A MEAL 180 tablet 1    diclofenac sodium (VOLTAREN) 1 % Gel Apply small amount (2 grams) to painful joint area 4 times daily as needed 100 g 1    etodolac (LODINE) 400 MG tablet Take 1 tablet (400 mg total) by mouth 2 (two) times daily. 60 tablet 0    fluticasone propionate (FLONASE) 50 mcg/actuation nasal spray 1 spray (50 mcg total) by Each Nostril route once daily. 18.2 mL 0    gabapentin (NEURONTIN) 100 MG capsule TAKE ONE CAPSULE BY MOUTH THREE TIMES DAILY AS NEEDED FOR BACK PAIN 90 capsule 2    melatonin 3 mg Tab Take 3 mg by mouth every evening.      montelukast (SINGULAIR) 10 mg tablet Take 1 tablet (10 mg total) by mouth every evening. 90 tablet 1    pantoprazole (PROTONIX) 40 MG tablet Take 1 tablet (40 mg total) by mouth once daily. 90 tablet 1    pravastatin (PRAVACHOL) 20 MG tablet Take 1 tablet (20 mg total) by mouth once daily. 90 tablet 1    rizatriptan (MAXALT) 10 MG tablet Take 10 mg by mouth as needed for Migraine.      venlafaxine (EFFEXOR-XR) 75 MG 24 hr capsule TAKE ONE CAPSULE BY MOUTH EVERY DAY 90 capsule 1     Current Facility-Administered Medications on File Prior to Visit   Medication Dose Route Frequency Provider Last Rate Last Admin    sodium chloride 0.9% flush 10 mL  10 mL Intravenous Q6H PRN Uriah Duke MD         Review of patient's allergies indicates:   Allergen Reactions    Budesonide-formoterol Shortness Of Breath     Throat closes up     Duloxetine Shortness Of Breath and Swelling    Meloxicam Shortness Of Breath and Swelling    Simvastatin Shortness Of Breath and Swelling    Zoloft [sertraline] Anaphylaxis     Tongue swelling    Amoxicillin-pot clavulanate Diarrhea    Histamine h2 inhibitors Other (See Comments)      Constipation    Metoclopramide Nausea And Vomiting    Metoprolol Other (See Comments)     Cough    Phenothiazines Other (See Comments)     Not sure of reaction.     Family History   Problem Relation Age of Onset    COPD Maternal Uncle         colon     Social History     Socioeconomic History    Marital status:    Tobacco Use    Smoking status: Never    Smokeless tobacco: Never   Substance and Sexual Activity    Alcohol use: No    Drug use: No    Sexual activity: Never       Review of Systems:  Constitutional:  Denies fever or chills   Eyes:  Denies change in visual acuity   HENT:  Denies nasal congestion or sore throat   Respiratory:  Denies cough or shortness of breath   Cardiovascular:  Denies chest pain or edema   GI:  Denies abdominal pain, nausea, vomiting, bloody stools or diarrhea   :  Denies dysuria   Integument:  Denies rash   Neurologic:  Denies headache, focal weakness or sensory changes   Endocrine:  Denies polyuria or polydipsia   Lymphatic:  Denies swollen glands   Psychiatric:  Denies depression or anxiety     Physical Exam:   Constitutional:  Well developed, well nourished, no acute distress, non-toxic appearance   Integument:  Well hydrated, no rash   Lymphatic:  No lymphadenopathy noted   Neurologic:  Alert & oriented x 3, CN 2-12 normal, normal motor function, normal sensory function, no focal deficits noted   Psychiatric:  Speech and behavior appropriate   Eyes: EOMI  Gi: abdomen soft    Bilateral Knee Exam    right Knee Exam     Tenderness   The patient is experiencing tenderness in the medial joint line.    Range of Motion   Extension: 5   Flexion: 100     Muscle Strength     The patient has normal knee strength.    Tests   Wnog:  Medial - positive   Lachman:  Anterior - negative      Varus: negative  Valgus: negative  Patellar Apprehension: negative    Other   Erythema: absent  Sensation: normal  Pulse: present  Swelling: mild      left Knee Exam   left knee exam performed same  as contralateral side and is normal.            X-rays were performed, personally reviewed by me and findings discussed with the patient.  3 views of the right knee show tricompartmental degenerative change most pronounced in the medial compartment with Kellgren 3 changes    Primary osteoarthritis of right knee          I had a long discussion with the patient regarding the benefits and risks of right TKA. They voiced understanding and wish to proceed with surgery. Consents signed in clinic.

## 2022-10-20 NOTE — PROGRESS NOTES
Subjective:       Patient ID: Maria M Reeves is a 71 y.o. female.    Chief Complaint: Pre-op Exam    HPI    She comes in for preop clearance. she is scheduled to have right knee replacement done by Dr. Duke on 10/28/22.  Medical history includes DM, HTN, hyperlipidemia. Preop testing includes EKG, CBC, and CMP. she denies shortness of breath or chest pain, no dizziness or syncope, no headaches or blurry vision. Denies fever. she is not currently on blood thinners.      Review of Systems   Constitutional:  Negative for fatigue, fever and unexpected weight change.   HENT:  Negative for ear pain and sore throat.    Eyes:  Negative for pain and visual disturbance.   Respiratory:  Negative for cough and shortness of breath.    Cardiovascular:  Negative for chest pain and palpitations.   Gastrointestinal:  Negative for abdominal pain, diarrhea and vomiting.   Musculoskeletal:  Negative for arthralgias and myalgias.   Skin:  Negative for color change and rash.   Neurological:  Negative for dizziness and headaches.   Psychiatric/Behavioral:  Negative for dysphoric mood and sleep disturbance. The patient is not nervous/anxious.      Vitals:    10/21/22 1006   BP: 129/60   Pulse: 72   Temp: 98.6 °F (37 °C)       Objective:     Current Outpatient Medications   Medication Sig Dispense Refill    albuterol (PROVENTIL HFA) 90 mcg/actuation inhaler Inhale 2 puffs into the lungs every 6 (six) hours as needed. 1 Inhaler 3    albuterol (PROVENTIL/VENTOLIN HFA) 90 mcg/actuation inhaler Inhale 2 puffs into the lungs every 6 (six) hours as needed for Wheezing. 18 g 1    albuterol sulfate 2.5 mg/0.5 mL Nebu Take 2.5 mg by nebulization every 6 (six) hours as needed.      ALPRAZolam (XANAX) 0.5 MG tablet TAKE ONE TABLET BY MOUTH EVERY NIGHT AT BEDTIME AS NEEDED FOR ANXIETY 30 tablet 5    aspirin (ECOTRIN) 81 MG EC tablet Take 81 mg by mouth every evening.      butalbital-acetaminophen-caffeine -40 mg (FIORICET, ESGIC) -40 mg  per tablet TAKE ONE TABLET BY MOUTH EVERY 4 HOURS AS NEEDED 30 tablet 4    calcium carbonate (OS-MARY) 500 mg calcium (1,250 mg) tablet Take 1 tablet (500 mg total) by mouth once daily. 90 tablet 1    carvediloL (COREG) 12.5 MG tablet TAKE ONE TABLET BY MOUTH TWICE DAILY WITH A MEAL 180 tablet 1    cetirizine (ZYRTEC) 10 MG tablet Take 10 mg by mouth once daily.      [START ON 10/26/2022] chlorhexidine (PERIDEX) 0.12 % solution Use as directed 10 mLs in the mouth or throat 2 (two) times daily.      diclofenac sodium (VOLTAREN) 1 % Gel Apply small amount (2 grams) to painful joint area 4 times daily as needed 100 g 1    fluticasone propionate (FLONASE) 50 mcg/actuation nasal spray 1 spray (50 mcg total) by Each Nostril route once daily. 18.2 mL 0    gabapentin (NEURONTIN) 100 MG capsule TAKE ONE CAPSULE BY MOUTH THREE TIMES DAILY AS NEEDED FOR BACK PAIN 90 capsule 2    melatonin 3 mg Tab Take 3 mg by mouth every evening.      montelukast (SINGULAIR) 10 mg tablet Take 1 tablet (10 mg total) by mouth every evening. 90 tablet 1    [START ON 10/26/2022] mupirocin (BACTROBAN) 2 % ointment 1 g by Nasal route 2 (two) times daily.      pantoprazole (PROTONIX) 40 MG tablet Take 1 tablet (40 mg total) by mouth once daily. (Patient taking differently: Take 40 mg by mouth every evening.) 90 tablet 1    pravastatin (PRAVACHOL) 20 MG tablet Take 1 tablet (20 mg total) by mouth once daily. (Patient taking differently: Take 20 mg by mouth every evening.) 90 tablet 1    SALONPAS, LIDOCAINE, TOP Apply topically once daily.      venlafaxine (EFFEXOR-XR) 75 MG 24 hr capsule TAKE ONE CAPSULE BY MOUTH EVERY DAY (Patient taking differently: Take 75 mg by mouth every evening.) 90 capsule 1     Current Facility-Administered Medications   Medication Dose Route Frequency Provider Last Rate Last Admin    sodium chloride 0.9% flush 10 mL  10 mL Intravenous Q6H PRN Uriah Duke MD           Physical Exam  Vitals and nursing note reviewed.    Constitutional:       General: She is not in acute distress.     Appearance: She is well-developed.   HENT:      Head: Normocephalic and atraumatic.   Eyes:      Pupils: Pupils are equal, round, and reactive to light.   Cardiovascular:      Rate and Rhythm: Normal rate and regular rhythm.   Pulmonary:      Effort: Pulmonary effort is normal.      Breath sounds: Normal breath sounds.   Musculoskeletal:         General: Normal range of motion.      Cervical back: Normal range of motion and neck supple.   Skin:     General: Skin is warm and dry.      Findings: No rash.   Neurological:      Mental Status: She is alert and oriented to person, place, and time.   Psychiatric:         Judgment: Judgment normal.       Lab Results   Component Value Date    HGBA1C 5.1 10/20/2022     Lab Results   Component Value Date    WBC 7.85 10/20/2022    HGB 13.6 10/20/2022    HCT 41.6 10/20/2022    MCV 92 10/20/2022     10/20/2022       CMP  Sodium   Date Value Ref Range Status   10/20/2022 137 136 - 145 mmol/L Final   05/20/2019 139  Final     Potassium   Date Value Ref Range Status   10/20/2022 4.4 3.5 - 5.1 mmol/L Final     Comment:     Specimen slightly hemolyzed   05/20/2019 4.4  Final     Chloride   Date Value Ref Range Status   10/20/2022 98 95 - 110 mmol/L Final   05/20/2019 102  Final     CO2   Date Value Ref Range Status   10/20/2022 30 22 - 31 mmol/L Final   05/20/2019 28  Final     Glucose   Date Value Ref Range Status   10/20/2022 86 70 - 110 mg/dL Final     Comment:     The ADA recommends the following guidelines for fasting glucose:    Normal:       less than 100 mg/dL    Prediabetes:  100 mg/dL to 125 mg/dL    Diabetes:     126 mg/dL or higher       BUN   Date Value Ref Range Status   10/20/2022 15 7 - 18 mg/dL Final   05/20/2019 15  Final     Creatinine   Date Value Ref Range Status   10/20/2022 0.65 0.50 - 1.40 mg/dL Final   05/20/2019 0.8 mg/dL Final     Calcium   Date Value Ref Range Status   10/20/2022 9.3  8.4 - 10.2 mg/dL Final   05/20/2019 9.4 mg/dL Final     Total Protein   Date Value Ref Range Status   10/20/2022 7.5 6.0 - 8.4 g/dL Final     Albumin   Date Value Ref Range Status   10/20/2022 4.3 3.5 - 5.2 g/dL Final   05/20/2019 3.9  Final     Total Bilirubin   Date Value Ref Range Status   10/20/2022 0.6 0.2 - 1.3 mg/dL Final     Alkaline Phosphatase   Date Value Ref Range Status   10/20/2022 179 (H) 38 - 145 U/L Final   05/20/2019 156 U/L Final     AST   Date Value Ref Range Status   10/20/2022 40 (H) 14 - 36 U/L Final     ALT   Date Value Ref Range Status   10/20/2022 23 0 - 35 U/L Final   05/20/2019 21  Final     Anion Gap   Date Value Ref Range Status   10/20/2022 9 8 - 16 mmol/L Final     eGFR if    Date Value Ref Range Status   01/05/2022 >60.0 >60 mL/min/1.73 m^2 Final     eGFR if non    Date Value Ref Range Status   01/05/2022 >60.0 >60 mL/min/1.73 m^2 Final     Comment:     Calculation used to obtain the estimated glomerular filtration  rate (eGFR) is the CKD-EPI equation.          Assessment:       1. Preoperative clearance          Plan:   Preoperative clearance    I have reviewed her EKG, CBC, A1C, and CMP.   She is clear to proceed with surgery    No follow-ups on file.    There are no Patient Instructions on file for this visit.

## 2022-10-21 ENCOUNTER — OFFICE VISIT (OUTPATIENT)
Dept: FAMILY MEDICINE | Facility: CLINIC | Age: 72
End: 2022-10-21
Payer: MEDICARE

## 2022-10-21 VITALS
TEMPERATURE: 99 F | BODY MASS INDEX: 41.54 KG/M2 | SYSTOLIC BLOOD PRESSURE: 129 MMHG | HEIGHT: 62 IN | OXYGEN SATURATION: 97 % | WEIGHT: 225.75 LBS | DIASTOLIC BLOOD PRESSURE: 60 MMHG | HEART RATE: 72 BPM

## 2022-10-21 DIAGNOSIS — Z01.818 PREOPERATIVE CLEARANCE: Primary | ICD-10-CM

## 2022-10-21 PROCEDURE — 3074F PR MOST RECENT SYSTOLIC BLOOD PRESSURE < 130 MM HG: ICD-10-PCS | Mod: CPTII,S$GLB,, | Performed by: NURSE PRACTITIONER

## 2022-10-21 PROCEDURE — 99214 OFFICE O/P EST MOD 30 MIN: CPT | Mod: S$GLB,,, | Performed by: NURSE PRACTITIONER

## 2022-10-21 PROCEDURE — 99214 PR OFFICE/OUTPT VISIT, EST, LEVL IV, 30-39 MIN: ICD-10-PCS | Mod: S$GLB,,, | Performed by: NURSE PRACTITIONER

## 2022-10-21 PROCEDURE — 99999 PR PBB SHADOW E&M-EST. PATIENT-LVL IV: ICD-10-PCS | Mod: PBBFAC,,, | Performed by: NURSE PRACTITIONER

## 2022-10-21 PROCEDURE — 1160F PR REVIEW ALL MEDS BY PRESCRIBER/CLIN PHARMACIST DOCUMENTED: ICD-10-PCS | Mod: CPTII,S$GLB,, | Performed by: NURSE PRACTITIONER

## 2022-10-21 PROCEDURE — 1159F PR MEDICATION LIST DOCUMENTED IN MEDICAL RECORD: ICD-10-PCS | Mod: CPTII,S$GLB,, | Performed by: NURSE PRACTITIONER

## 2022-10-21 PROCEDURE — 3078F DIAST BP <80 MM HG: CPT | Mod: CPTII,S$GLB,, | Performed by: NURSE PRACTITIONER

## 2022-10-21 PROCEDURE — 3288F FALL RISK ASSESSMENT DOCD: CPT | Mod: CPTII,S$GLB,, | Performed by: NURSE PRACTITIONER

## 2022-10-21 PROCEDURE — 3078F PR MOST RECENT DIASTOLIC BLOOD PRESSURE < 80 MM HG: ICD-10-PCS | Mod: CPTII,S$GLB,, | Performed by: NURSE PRACTITIONER

## 2022-10-21 PROCEDURE — 1101F PR PT FALLS ASSESS DOC 0-1 FALLS W/OUT INJ PAST YR: ICD-10-PCS | Mod: CPTII,S$GLB,, | Performed by: NURSE PRACTITIONER

## 2022-10-21 PROCEDURE — 3044F PR MOST RECENT HEMOGLOBIN A1C LEVEL <7.0%: ICD-10-PCS | Mod: CPTII,S$GLB,, | Performed by: NURSE PRACTITIONER

## 2022-10-21 PROCEDURE — 1160F RVW MEDS BY RX/DR IN RCRD: CPT | Mod: CPTII,S$GLB,, | Performed by: NURSE PRACTITIONER

## 2022-10-21 PROCEDURE — 1159F MED LIST DOCD IN RCRD: CPT | Mod: CPTII,S$GLB,, | Performed by: NURSE PRACTITIONER

## 2022-10-21 PROCEDURE — 3288F PR FALLS RISK ASSESSMENT DOCUMENTED: ICD-10-PCS | Mod: CPTII,S$GLB,, | Performed by: NURSE PRACTITIONER

## 2022-10-21 PROCEDURE — 3044F HG A1C LEVEL LT 7.0%: CPT | Mod: CPTII,S$GLB,, | Performed by: NURSE PRACTITIONER

## 2022-10-21 PROCEDURE — 99999 PR PBB SHADOW E&M-EST. PATIENT-LVL IV: CPT | Mod: PBBFAC,,, | Performed by: NURSE PRACTITIONER

## 2022-10-21 PROCEDURE — 1101F PT FALLS ASSESS-DOCD LE1/YR: CPT | Mod: CPTII,S$GLB,, | Performed by: NURSE PRACTITIONER

## 2022-10-21 PROCEDURE — 3074F SYST BP LT 130 MM HG: CPT | Mod: CPTII,S$GLB,, | Performed by: NURSE PRACTITIONER

## 2022-10-25 ENCOUNTER — PATIENT MESSAGE (OUTPATIENT)
Dept: ADMINISTRATIVE | Facility: HOSPITAL | Age: 72
End: 2022-10-25
Payer: MEDICARE

## 2022-10-28 PROBLEM — M17.11 PRIMARY OSTEOARTHRITIS OF RIGHT KNEE: Status: ACTIVE | Noted: 2022-10-28

## 2022-10-31 ENCOUNTER — TELEPHONE (OUTPATIENT)
Dept: ORTHOPEDICS | Facility: CLINIC | Age: 72
End: 2022-10-31
Payer: MEDICARE

## 2022-10-31 PROCEDURE — G0180 MD CERTIFICATION HHA PATIENT: HCPCS | Mod: ,,, | Performed by: ORTHOPAEDIC SURGERY

## 2022-10-31 PROCEDURE — G0180 PR HOME HEALTH MD CERTIFICATION: ICD-10-PCS | Mod: ,,, | Performed by: ORTHOPAEDIC SURGERY

## 2022-10-31 NOTE — TELEPHONE ENCOUNTER
----- Message from Mariana Nobles MA sent at 10/31/2022  9:47 AM CDT -----  Contact: friend riki phipps  Question on  PT   Call back

## 2022-11-02 DIAGNOSIS — Z96.651 STATUS POST TOTAL RIGHT KNEE REPLACEMENT: Primary | ICD-10-CM

## 2022-11-02 NOTE — TELEPHONE ENCOUNTER
----- Message from Hernando Martinez sent at 11/2/2022  2:13 PM CDT -----  Regarding: wants a different pain medication,call pt   Contact: pt   wants a different pain medication,call pt

## 2022-11-03 RX ORDER — OXYCODONE HYDROCHLORIDE 5 MG/1
5 TABLET ORAL EVERY 4 HOURS PRN
Qty: 22 TABLET | Refills: 0 | Status: SHIPPED | OUTPATIENT
Start: 2022-11-03 | End: 2022-11-10

## 2022-11-04 DIAGNOSIS — M17.11 PRIMARY OSTEOARTHRITIS OF RIGHT KNEE: Primary | ICD-10-CM

## 2022-11-05 DIAGNOSIS — J30.9 CHRONIC ALLERGIC RHINITIS: ICD-10-CM

## 2022-11-05 DIAGNOSIS — Z79.899 ENCOUNTER FOR LONG-TERM (CURRENT) USE OF MEDICATIONS: ICD-10-CM

## 2022-11-05 DIAGNOSIS — Z76.0 MEDICATION REFILL: ICD-10-CM

## 2022-11-07 RX ORDER — MONTELUKAST SODIUM 10 MG/1
10 TABLET ORAL NIGHTLY
Qty: 90 TABLET | Refills: 1 | Status: SHIPPED | OUTPATIENT
Start: 2022-11-07 | End: 2022-12-09 | Stop reason: SDUPTHER

## 2022-11-07 RX ORDER — PANTOPRAZOLE SODIUM 40 MG/1
40 TABLET, DELAYED RELEASE ORAL DAILY
Qty: 90 TABLET | Refills: 1 | Status: SHIPPED | OUTPATIENT
Start: 2022-11-07 | End: 2022-12-13 | Stop reason: SDUPTHER

## 2022-11-10 ENCOUNTER — OFFICE VISIT (OUTPATIENT)
Dept: ORTHOPEDICS | Facility: CLINIC | Age: 72
End: 2022-11-10
Payer: MEDICARE

## 2022-11-10 ENCOUNTER — HOSPITAL ENCOUNTER (OUTPATIENT)
Dept: RADIOLOGY | Facility: HOSPITAL | Age: 72
Discharge: HOME | End: 2022-11-10
Attending: NURSE PRACTITIONER
Payer: MEDICARE

## 2022-11-10 VITALS — BODY MASS INDEX: 41.77 KG/M2 | WEIGHT: 227 LBS | HEIGHT: 62 IN

## 2022-11-10 DIAGNOSIS — M17.11 PRIMARY OSTEOARTHRITIS OF RIGHT KNEE: ICD-10-CM

## 2022-11-10 DIAGNOSIS — Z96.651 S/P TOTAL KNEE ARTHROPLASTY, RIGHT: Primary | ICD-10-CM

## 2022-11-10 PROCEDURE — 3008F PR BODY MASS INDEX (BMI) DOCUMENTED: ICD-10-PCS | Mod: CPTII,S$GLB,, | Performed by: NURSE PRACTITIONER

## 2022-11-10 PROCEDURE — 3288F FALL RISK ASSESSMENT DOCD: CPT | Mod: CPTII,S$GLB,, | Performed by: NURSE PRACTITIONER

## 2022-11-10 PROCEDURE — 3044F HG A1C LEVEL LT 7.0%: CPT | Mod: CPTII,S$GLB,, | Performed by: NURSE PRACTITIONER

## 2022-11-10 PROCEDURE — 3288F PR FALLS RISK ASSESSMENT DOCUMENTED: ICD-10-PCS | Mod: CPTII,S$GLB,, | Performed by: NURSE PRACTITIONER

## 2022-11-10 PROCEDURE — 1159F MED LIST DOCD IN RCRD: CPT | Mod: CPTII,S$GLB,, | Performed by: NURSE PRACTITIONER

## 2022-11-10 PROCEDURE — 3008F BODY MASS INDEX DOCD: CPT | Mod: CPTII,S$GLB,, | Performed by: NURSE PRACTITIONER

## 2022-11-10 PROCEDURE — 99024 POSTOP FOLLOW-UP VISIT: CPT | Mod: S$GLB,,, | Performed by: NURSE PRACTITIONER

## 2022-11-10 PROCEDURE — 99999 PR PBB SHADOW E&M-EST. PATIENT-LVL IV: ICD-10-PCS | Mod: PBBFAC,,, | Performed by: NURSE PRACTITIONER

## 2022-11-10 PROCEDURE — 1101F PR PT FALLS ASSESS DOC 0-1 FALLS W/OUT INJ PAST YR: ICD-10-PCS | Mod: CPTII,S$GLB,, | Performed by: NURSE PRACTITIONER

## 2022-11-10 PROCEDURE — 73560 X-RAY EXAM OF KNEE 1 OR 2: CPT | Mod: TC,PO,LT

## 2022-11-10 PROCEDURE — 73560 X-RAY EXAM OF KNEE 1 OR 2: CPT | Mod: 26,LT,, | Performed by: RADIOLOGY

## 2022-11-10 PROCEDURE — 99999 PR PBB SHADOW E&M-EST. PATIENT-LVL IV: CPT | Mod: PBBFAC,,, | Performed by: NURSE PRACTITIONER

## 2022-11-10 PROCEDURE — 73562 XR KNEE ORTHO RIGHT: ICD-10-PCS | Mod: 26,RT,, | Performed by: RADIOLOGY

## 2022-11-10 PROCEDURE — 1159F PR MEDICATION LIST DOCUMENTED IN MEDICAL RECORD: ICD-10-PCS | Mod: CPTII,S$GLB,, | Performed by: NURSE PRACTITIONER

## 2022-11-10 PROCEDURE — 73560 XR KNEE ORTHO RIGHT: ICD-10-PCS | Mod: 26,LT,, | Performed by: RADIOLOGY

## 2022-11-10 PROCEDURE — 73562 X-RAY EXAM OF KNEE 3: CPT | Mod: 26,RT,, | Performed by: RADIOLOGY

## 2022-11-10 PROCEDURE — 3044F PR MOST RECENT HEMOGLOBIN A1C LEVEL <7.0%: ICD-10-PCS | Mod: CPTII,S$GLB,, | Performed by: NURSE PRACTITIONER

## 2022-11-10 PROCEDURE — 1125F AMNT PAIN NOTED PAIN PRSNT: CPT | Mod: CPTII,S$GLB,, | Performed by: NURSE PRACTITIONER

## 2022-11-10 PROCEDURE — 1101F PT FALLS ASSESS-DOCD LE1/YR: CPT | Mod: CPTII,S$GLB,, | Performed by: NURSE PRACTITIONER

## 2022-11-10 PROCEDURE — 99024 PR POST-OP FOLLOW-UP VISIT: ICD-10-PCS | Mod: S$GLB,,, | Performed by: NURSE PRACTITIONER

## 2022-11-10 PROCEDURE — 1125F PR PAIN SEVERITY QUANTIFIED, PAIN PRESENT: ICD-10-PCS | Mod: CPTII,S$GLB,, | Performed by: NURSE PRACTITIONER

## 2022-11-10 RX ORDER — METHOCARBAMOL 750 MG/1
1500 TABLET, FILM COATED ORAL 3 TIMES DAILY PRN
Qty: 120 TABLET | Refills: 2 | Status: SHIPPED | OUTPATIENT
Start: 2022-11-10 | End: 2022-11-28 | Stop reason: SDUPTHER

## 2022-11-10 RX ORDER — OXYCODONE HYDROCHLORIDE 5 MG/1
5 TABLET ORAL EVERY 6 HOURS PRN
Qty: 28 TABLET | Refills: 0 | Status: SHIPPED | OUTPATIENT
Start: 2022-11-10 | End: 2022-11-16 | Stop reason: SDUPTHER

## 2022-11-10 RX ORDER — TRAMADOL HYDROCHLORIDE 50 MG/1
50 TABLET ORAL EVERY 6 HOURS PRN
Qty: 28 TABLET | Refills: 0 | Status: SHIPPED | OUTPATIENT
Start: 2022-11-10 | End: 2022-11-16 | Stop reason: SDUPTHER

## 2022-11-10 NOTE — PROGRESS NOTES
Chief Complaint   Patient presents with    Right Knee - Pain, Post-op Evaluation, Suture / Staple Removal       HPI:  71 y.o. female returns to clinic today status post right TKA 2 weeks ago. Pain is intermittent. Patient is compliant most of the time with restrictions.     Right knee  0-90  Incision healed. No erythema or fluctuance. Overall normal alignment. Compartments soft. Skin intact. NVI distally.         X-rays were performed today, personally reviewed by me and findings discussed with the patient.  3 views of the right knee show implants intact without any evidence of loosening.         S/P total knee arthroplasty, right  -     Ambulatory referral/consult to Physical/Occupational Therapy; Future; Expected date: 11/17/2022    Other orders  -     traMADoL (ULTRAM) 50 mg tablet; Take 1 tablet (50 mg total) by mouth every 6 (six) hours as needed (moderate pain).  Dispense: 28 tablet; Refill: 0  -     oxyCODONE (ROXICODONE) 5 MG immediate release tablet; Take 1 tablet (5 mg total) by mouth every 6 (six) hours as needed (severe pain).  Dispense: 28 tablet; Refill: 0  -     methocarbamoL (ROBAXIN) 750 MG Tab; Take 2 tablets (1,500 mg total) by mouth 3 (three) times daily as needed (muscle spasms).  Dispense: 120 tablet; Refill: 2      Return to clinic in 6 weeks.

## 2022-11-11 ENCOUNTER — DOCUMENT SCAN (OUTPATIENT)
Dept: HOME HEALTH SERVICES | Facility: HOSPITAL | Age: 72
End: 2022-11-11
Payer: MEDICARE

## 2022-11-15 ENCOUNTER — EXTERNAL HOME HEALTH (OUTPATIENT)
Dept: HOME HEALTH SERVICES | Facility: HOSPITAL | Age: 72
End: 2022-11-15
Payer: MEDICARE

## 2022-12-07 ENCOUNTER — TELEPHONE (OUTPATIENT)
Dept: FAMILY MEDICINE | Facility: CLINIC | Age: 72
End: 2022-12-07
Payer: MEDICARE

## 2022-12-07 NOTE — TELEPHONE ENCOUNTER
----- Message from Ashleigh Solano sent at 12/7/2022 10:36 AM CST -----  Contact: Ruth Ann / Joe Vallecillo Is calling in regards to ALPRAZolam (XANAX) 0.5 MG tablet. Ruth Ann states the patient is also getting pain mediation from a different provide . Ruth Ann states she just need a call back states that the doctor know about the pain meds. Please call her back at [phone]        Thanks  Cf

## 2022-12-08 DIAGNOSIS — Z96.651 S/P TOTAL KNEE ARTHROPLASTY, RIGHT: Primary | ICD-10-CM

## 2022-12-08 RX ORDER — TRAMADOL HYDROCHLORIDE 50 MG/1
50 TABLET ORAL EVERY 6 HOURS PRN
Qty: 28 TABLET | Refills: 0 | Status: SHIPPED | OUTPATIENT
Start: 2022-12-08 | End: 2022-12-15

## 2022-12-08 RX ORDER — OXYCODONE HYDROCHLORIDE 5 MG/1
5 TABLET ORAL EVERY 4 HOURS PRN
Qty: 28 TABLET | Refills: 0 | Status: SHIPPED | OUTPATIENT
Start: 2022-12-08 | End: 2022-12-15

## 2022-12-16 RX ORDER — METHOCARBAMOL 750 MG/1
1500 TABLET, FILM COATED ORAL 3 TIMES DAILY PRN
Qty: 120 TABLET | Refills: 2 | Status: CANCELLED | OUTPATIENT
Start: 2022-12-16 | End: 2023-02-14

## 2022-12-20 RX ORDER — METHOCARBAMOL 750 MG/1
750 TABLET, FILM COATED ORAL 4 TIMES DAILY PRN
Qty: 44 TABLET | Refills: 3 | Status: SHIPPED | OUTPATIENT
Start: 2022-12-20 | End: 2023-01-02 | Stop reason: SDUPTHER

## 2022-12-20 RX ORDER — OXYCODONE HYDROCHLORIDE 5 MG/1
5 TABLET ORAL EVERY 6 HOURS PRN
Qty: 33 TABLET | Refills: 0 | Status: SHIPPED | OUTPATIENT
Start: 2022-12-20 | End: 2023-01-02 | Stop reason: SDUPTHER

## 2022-12-21 ENCOUNTER — TELEPHONE (OUTPATIENT)
Dept: FAMILY MEDICINE | Facility: CLINIC | Age: 72
End: 2022-12-21
Payer: MEDICARE

## 2022-12-21 NOTE — TELEPHONE ENCOUNTER
----- Message from Heidy Calhoun sent at 12/21/2022  3:08 PM CST -----  Regarding: needs to schedule mammo  Type: Needs Medical Advice  Who Called:  Maria M    [place] Call Back Number: [phone]    Additional Information:     Hina called wanting to schedule Mammogram for 12/29 in [place]. I tried to schedule and it was not allowing me too so I said I would reach out to the office. Please call and advise. Thank you!

## 2022-12-30 DIAGNOSIS — Z96.651 S/P TOTAL KNEE ARTHROPLASTY, RIGHT: Primary | ICD-10-CM

## 2023-01-03 ENCOUNTER — TELEPHONE (OUTPATIENT)
Dept: FAMILY MEDICINE | Facility: CLINIC | Age: 73
End: 2023-01-03
Payer: MEDICARE

## 2023-01-08 DIAGNOSIS — Z79.899 ENCOUNTER FOR LONG-TERM (CURRENT) USE OF MEDICATIONS: ICD-10-CM

## 2023-01-08 DIAGNOSIS — G47.00 INSOMNIA, UNSPECIFIED TYPE: ICD-10-CM

## 2023-01-08 DIAGNOSIS — Z76.0 MEDICATION REFILL: ICD-10-CM

## 2023-01-09 DIAGNOSIS — Z76.0 MEDICATION REFILL: ICD-10-CM

## 2023-01-09 DIAGNOSIS — G47.00 INSOMNIA, UNSPECIFIED TYPE: ICD-10-CM

## 2023-01-09 DIAGNOSIS — Z79.899 ENCOUNTER FOR LONG-TERM (CURRENT) USE OF MEDICATIONS: ICD-10-CM

## 2023-01-09 RX ORDER — ALPRAZOLAM 0.5 MG/1
TABLET ORAL
Qty: 30 TABLET | Refills: 5 | OUTPATIENT
Start: 2023-01-09

## 2023-01-10 RX ORDER — ALPRAZOLAM 0.5 MG/1
TABLET ORAL
Qty: 30 TABLET | Refills: 5 | Status: SHIPPED | OUTPATIENT
Start: 2023-01-10 | End: 2023-02-09 | Stop reason: SDUPTHER

## 2023-01-16 DIAGNOSIS — Z96.651 S/P TOTAL KNEE ARTHROPLASTY, RIGHT: Primary | ICD-10-CM

## 2023-01-16 RX ORDER — TRAMADOL HYDROCHLORIDE 50 MG/1
50 TABLET ORAL EVERY 6 HOURS PRN
Qty: 28 TABLET | Refills: 0 | Status: SHIPPED | OUTPATIENT
Start: 2023-01-16 | End: 2023-01-21 | Stop reason: SDUPTHER

## 2023-02-06 ENCOUNTER — PATIENT OUTREACH (OUTPATIENT)
Dept: ADMINISTRATIVE | Facility: HOSPITAL | Age: 73
End: 2023-02-06
Payer: MEDICARE

## 2023-02-06 DIAGNOSIS — Z12.31 ENCOUNTER FOR SCREENING MAMMOGRAM FOR MALIGNANT NEOPLASM OF BREAST: Primary | ICD-10-CM

## 2023-02-06 NOTE — PROGRESS NOTES
Working mammogram report; Chart searched; Called pt to schedule overdue mammogram; Scheduled 2/10/2023

## 2023-02-09 DIAGNOSIS — Z79.899 ENCOUNTER FOR LONG-TERM (CURRENT) USE OF MEDICATIONS: ICD-10-CM

## 2023-02-09 DIAGNOSIS — Z76.0 MEDICATION REFILL: ICD-10-CM

## 2023-02-10 ENCOUNTER — HOSPITAL ENCOUNTER (OUTPATIENT)
Dept: RADIOLOGY | Facility: HOSPITAL | Age: 73
Discharge: HOME | End: 2023-02-10
Attending: FAMILY MEDICINE
Payer: MEDICARE

## 2023-02-10 VITALS — BODY MASS INDEX: 41.77 KG/M2 | WEIGHT: 227 LBS | HEIGHT: 62 IN

## 2023-02-10 DIAGNOSIS — Z12.31 ENCOUNTER FOR SCREENING MAMMOGRAM FOR MALIGNANT NEOPLASM OF BREAST: ICD-10-CM

## 2023-02-10 PROCEDURE — 77067 SCR MAMMO BI INCL CAD: CPT | Mod: TC,PO

## 2023-02-10 PROCEDURE — 77063 BREAST TOMOSYNTHESIS BI: CPT | Mod: 26,,, | Performed by: RADIOLOGY

## 2023-02-10 PROCEDURE — 77067 MAMMO DIGITAL SCREENING BILAT WITH TOMO: ICD-10-PCS | Mod: 26,,, | Performed by: RADIOLOGY

## 2023-02-10 PROCEDURE — 77067 SCR MAMMO BI INCL CAD: CPT | Mod: 26,,, | Performed by: RADIOLOGY

## 2023-02-10 PROCEDURE — 77063 MAMMO DIGITAL SCREENING BILAT WITH TOMO: ICD-10-PCS | Mod: 26,,, | Performed by: RADIOLOGY

## 2023-02-10 RX ORDER — PRAVASTATIN SODIUM 20 MG/1
20 TABLET ORAL DAILY
Qty: 90 TABLET | Refills: 1 | Status: SHIPPED | OUTPATIENT
Start: 2023-02-10 | End: 2023-03-11 | Stop reason: SDUPTHER

## 2023-02-10 RX ORDER — GABAPENTIN 100 MG/1
CAPSULE ORAL
Qty: 90 CAPSULE | Refills: 2 | Status: SHIPPED | OUTPATIENT
Start: 2023-02-10 | End: 2023-02-12 | Stop reason: SDUPTHER

## 2023-02-12 DIAGNOSIS — Z76.0 MEDICATION REFILL: ICD-10-CM

## 2023-02-12 DIAGNOSIS — J30.9 CHRONIC ALLERGIC RHINITIS: ICD-10-CM

## 2023-02-12 DIAGNOSIS — Z79.899 ENCOUNTER FOR LONG-TERM (CURRENT) USE OF MEDICATIONS: ICD-10-CM

## 2023-02-12 DIAGNOSIS — G47.00 INSOMNIA, UNSPECIFIED TYPE: ICD-10-CM

## 2023-02-13 RX ORDER — ALPRAZOLAM 0.5 MG/1
TABLET ORAL
Qty: 30 TABLET | Refills: 1 | OUTPATIENT
Start: 2023-02-13

## 2023-02-13 RX ORDER — MONTELUKAST SODIUM 10 MG/1
10 TABLET ORAL NIGHTLY
Qty: 90 TABLET | Refills: 1 | Status: SHIPPED | OUTPATIENT
Start: 2023-02-13 | End: 2023-02-15 | Stop reason: SDUPTHER

## 2023-02-13 RX ORDER — GABAPENTIN 100 MG/1
CAPSULE ORAL
Qty: 90 CAPSULE | Refills: 0 | Status: SHIPPED | OUTPATIENT
Start: 2023-02-13 | End: 2023-02-15 | Stop reason: SDUPTHER

## 2023-02-13 RX ORDER — CARVEDILOL 12.5 MG/1
TABLET ORAL
Qty: 180 TABLET | Refills: 0 | Status: SHIPPED | OUTPATIENT
Start: 2023-02-13

## 2023-02-13 NOTE — TELEPHONE ENCOUNTER
No new care gaps identified.  [facility] Embedded Care Gaps. Reference number: [id number]. 2/12/2023   7:23:09 PM CST

## 2023-03-11 DIAGNOSIS — Z79.899 ENCOUNTER FOR LONG-TERM (CURRENT) USE OF MEDICATIONS: ICD-10-CM

## 2023-03-11 DIAGNOSIS — Z76.0 MEDICATION REFILL: ICD-10-CM

## 2023-03-11 NOTE — TELEPHONE ENCOUNTER
No new care gaps identified.  [facility] Embedded Care Gaps. Reference number: [id number]. 3/11/2023   4:40:13 PM CST

## 2023-03-13 RX ORDER — PRAVASTATIN SODIUM 20 MG/1
20 TABLET ORAL DAILY
Qty: 90 TABLET | Refills: 1 | Status: SHIPPED | OUTPATIENT
Start: 2023-03-13

## 2023-03-13 RX ORDER — VENLAFAXINE HYDROCHLORIDE 75 MG/1
75 CAPSULE, EXTENDED RELEASE ORAL DAILY
Qty: 90 CAPSULE | Refills: 1 | OUTPATIENT
Start: 2023-03-13

## 2023-03-13 RX ORDER — CALCIUM CARBONATE 500(1250)
1 TABLET ORAL DAILY
Qty: 90 TABLET | Refills: 1 | OUTPATIENT
Start: 2023-03-13 | End: 2023-09-09

## 2023-03-13 RX ORDER — GABAPENTIN 100 MG/1
CAPSULE ORAL
Qty: 90 CAPSULE | Refills: 3 | OUTPATIENT
Start: 2023-03-13

## 2023-04-15 DIAGNOSIS — Z76.0 MEDICATION REFILL: ICD-10-CM

## 2023-04-15 DIAGNOSIS — Z79.899 ENCOUNTER FOR LONG-TERM (CURRENT) USE OF MEDICATIONS: ICD-10-CM

## 2023-04-15 DIAGNOSIS — G47.00 INSOMNIA, UNSPECIFIED TYPE: ICD-10-CM

## 2023-04-15 DIAGNOSIS — J45.40 MODERATE PERSISTENT ASTHMA, UNSPECIFIED WHETHER COMPLICATED: ICD-10-CM

## 2023-04-15 NOTE — TELEPHONE ENCOUNTER
Care Due:                  Date            Visit Type   Department     Provider  --------------------------------------------------------------------------------                                EP -                              PRIMARY      [facility]  Last Visit: 01-      CARE ([facility])   MEDICINE       Uriah Saez  Next Visit: None Scheduled  None         None Found                                                            Last  Test          Frequency    Reason                     Performed    Due Date  --------------------------------------------------------------------------------    Office Visit  12 months..  albuterol, montelukast,    01- 12-                             venlafaxine..............    [facility] Embedded Care Gaps. Reference number: [id number]. 4/15/2023   12:41:49 PM CDT

## 2023-04-17 RX ORDER — ALPRAZOLAM 0.5 MG/1
TABLET ORAL
Qty: 30 TABLET | Refills: 1 | Status: SHIPPED | OUTPATIENT
Start: 2023-04-17 | End: 2023-05-02 | Stop reason: SDUPTHER

## 2023-04-17 RX ORDER — ALBUTEROL SULFATE 90 UG/1
2 AEROSOL, METERED RESPIRATORY (INHALATION) EVERY 6 HOURS PRN
Qty: 18 G | Refills: 1 | Status: SHIPPED | OUTPATIENT
Start: 2023-04-17

## 2023-05-02 DIAGNOSIS — Z79.899 ENCOUNTER FOR LONG-TERM (CURRENT) USE OF MEDICATIONS: ICD-10-CM

## 2023-05-02 DIAGNOSIS — G47.00 INSOMNIA, UNSPECIFIED TYPE: ICD-10-CM

## 2023-05-02 DIAGNOSIS — Z76.0 MEDICATION REFILL: ICD-10-CM

## 2023-05-03 RX ORDER — ALPRAZOLAM 0.5 MG/1
TABLET ORAL
Qty: 30 TABLET | Refills: 1 | Status: SHIPPED | OUTPATIENT
Start: 2023-05-03

## 2023-05-03 RX ORDER — BUTALBITAL, ACETAMINOPHEN AND CAFFEINE 50; 325; 40 MG/1; MG/1; MG/1
1 TABLET ORAL EVERY 4 HOURS PRN
Qty: 30 TABLET | Refills: 1 | Status: SHIPPED | OUTPATIENT
Start: 2023-05-03

## 2023-05-03 NOTE — TELEPHONE ENCOUNTER
Refill Routing Note   Medication(s) are not appropriate for processing by [facility] for the following reason(s):      Medication outside of protocol    [facility] action(s):  Route              Appointments  past 12m or future 3m with PCP    Date Provider   Last Visit   1/5/2022 Uriah Saez MD   Next Visit   5/2/2023 Uriah Seaz MD   ED visits in past 90 days: 0        Note composed:8:19 AM 05/03/2023

## 2023-05-03 NOTE — TELEPHONE ENCOUNTER
No care due was identified.  [facility] Embedded Care Due Messages. Reference number: [id number].   5/02/2023 7:44:14 PM CDT

## 2023-05-03 NOTE — TELEPHONE ENCOUNTER
Refill Routing Note   Medication(s) are not appropriate for processing by [facility] for the following reason(s):      Medication outside of protocol    [facility] action(s):  Route              Appointments  past 12m or future 3m with PCP    Date Provider   Last Visit   1/5/2022 Uriah Saez MD   Next Visit   Visit date not found Uriah Saez MD   ED visits in past 90 days: 0        Note composed:8:19 AM 05/03/2023

## 2023-05-03 NOTE — TELEPHONE ENCOUNTER
No care due was identified.  [facility] Embedded Care Due Messages. Reference number: [id number].   5/03/2023 8:19:06 AM CDT

## 2023-06-01 ENCOUNTER — TELEPHONE (OUTPATIENT)
Dept: FAMILY MEDICINE | Facility: CLINIC | Age: 73
End: 2023-06-01
Payer: MEDICARE

## 2024-01-31 DIAGNOSIS — Z78.0 MENOPAUSE: ICD-10-CM

## 2024-03-29 ENCOUNTER — PATIENT MESSAGE (OUTPATIENT)
Dept: FAMILY MEDICINE | Facility: CLINIC | Age: 74
End: 2024-03-29
Payer: MEDICAID

## 2024-05-18 ENCOUNTER — PATIENT MESSAGE (OUTPATIENT)
Dept: FAMILY MEDICINE | Facility: CLINIC | Age: 74
End: 2024-05-18
Payer: MEDICAID

## 2024-05-31 NOTE — TELEPHONE ENCOUNTER
----- Message from Karine Santos sent at 6/1/2023  1:30 PM CDT -----  Contact: [phone]  Type:  Sooner Apoointment Request    Caller is requesting a sooner appointment.  Caller declined first available appointment listed below.  Caller will not accept being placed on the waitlist and is requesting a message be sent to doctor.  Name of Caller:Maria M   When is the first available appointment?9/2023  Symptoms:heart palpitations  Would the patient rather a call back or a response via [facility]? Call back   Best Call Back Number:[phone]  Additional Information:pt prefers to see only Dr Saez      Thanks KB        
Called pt and scheduled appt.   
No

## 2024-09-11 ENCOUNTER — PATIENT MESSAGE (OUTPATIENT)
Dept: FAMILY MEDICINE | Facility: CLINIC | Age: 74
End: 2024-09-11
Payer: MEDICAID
